# Patient Record
Sex: MALE | URBAN - METROPOLITAN AREA
[De-identification: names, ages, dates, MRNs, and addresses within clinical notes are randomized per-mention and may not be internally consistent; named-entity substitution may affect disease eponyms.]

---

## 2020-12-06 ENCOUNTER — INPATIENT (INPATIENT)
Facility: HOSPITAL | Age: 70
LOS: 3 days | Discharge: HOME | DRG: 78 | End: 2020-12-10
Attending: FAMILY MEDICINE | Admitting: STUDENT IN AN ORGANIZED HEALTH CARE EDUCATION/TRAINING PROGRAM
Payer: COMMERCIAL

## 2020-12-06 VITALS
HEART RATE: 107 BPM | OXYGEN SATURATION: 99 % | RESPIRATION RATE: 18 BRPM | DIASTOLIC BLOOD PRESSURE: 66 MMHG | SYSTOLIC BLOOD PRESSURE: 203 MMHG | TEMPERATURE: 98 F | WEIGHT: 160.06 LBS | HEIGHT: 66 IN

## 2020-12-06 DIAGNOSIS — Z29.9 ENCOUNTER FOR PROPHYLACTIC MEASURES, UNSPECIFIED: ICD-10-CM

## 2020-12-06 DIAGNOSIS — R00.0 TACHYCARDIA, UNSPECIFIED: ICD-10-CM

## 2020-12-06 DIAGNOSIS — I25.10 ATHEROSCLEROTIC HEART DISEASE OF NATIVE CORONARY ARTERY WITHOUT ANGINA PECTORIS: ICD-10-CM

## 2020-12-06 DIAGNOSIS — F03.90 UNSPECIFIED DEMENTIA WITHOUT BEHAVIORAL DISTURBANCE: ICD-10-CM

## 2020-12-06 DIAGNOSIS — E11.9 TYPE 2 DIABETES MELLITUS WITHOUT COMPLICATIONS: ICD-10-CM

## 2020-12-06 DIAGNOSIS — Z95.1 PRESENCE OF AORTOCORONARY BYPASS GRAFT: Chronic | ICD-10-CM

## 2020-12-06 DIAGNOSIS — I10 ESSENTIAL (PRIMARY) HYPERTENSION: ICD-10-CM

## 2020-12-06 DIAGNOSIS — R41.82 ALTERED MENTAL STATUS, UNSPECIFIED: ICD-10-CM

## 2020-12-06 LAB
ALBUMIN SERPL ELPH-MCNC: 3.4 G/DL — SIGNIFICANT CHANGE UP (ref 3.3–5)
ALP SERPL-CCNC: 114 U/L — SIGNIFICANT CHANGE UP (ref 40–120)
ALT FLD-CCNC: 30 U/L — SIGNIFICANT CHANGE UP (ref 12–78)
AMMONIA BLD-MCNC: 32 UMOL/L — SIGNIFICANT CHANGE UP (ref 11–32)
AMPHET UR-MCNC: NEGATIVE — SIGNIFICANT CHANGE UP
ANION GAP SERPL CALC-SCNC: 11 MMOL/L — SIGNIFICANT CHANGE UP (ref 5–17)
APPEARANCE UR: CLEAR — SIGNIFICANT CHANGE UP
APTT BLD: 30.5 SEC — SIGNIFICANT CHANGE UP (ref 27.5–35.5)
AST SERPL-CCNC: 20 U/L — SIGNIFICANT CHANGE UP (ref 15–37)
BACTERIA # UR AUTO: ABNORMAL
BARBITURATES UR SCN-MCNC: NEGATIVE — SIGNIFICANT CHANGE UP
BASOPHILS # BLD AUTO: 0.04 K/UL — SIGNIFICANT CHANGE UP (ref 0–0.2)
BASOPHILS # BLD AUTO: 0.04 K/UL — SIGNIFICANT CHANGE UP (ref 0–0.2)
BASOPHILS NFR BLD AUTO: 0.3 % — SIGNIFICANT CHANGE UP (ref 0–2)
BASOPHILS NFR BLD AUTO: 0.4 % — SIGNIFICANT CHANGE UP (ref 0–2)
BENZODIAZ UR-MCNC: NEGATIVE — SIGNIFICANT CHANGE UP
BILIRUB SERPL-MCNC: 0.5 MG/DL — SIGNIFICANT CHANGE UP (ref 0.2–1.2)
BILIRUB UR-MCNC: NEGATIVE — SIGNIFICANT CHANGE UP
BUN SERPL-MCNC: 28 MG/DL — HIGH (ref 7–23)
CALCIUM SERPL-MCNC: 8.9 MG/DL — SIGNIFICANT CHANGE UP (ref 8.5–10.1)
CHLORIDE SERPL-SCNC: 104 MMOL/L — SIGNIFICANT CHANGE UP (ref 96–108)
CK MB CFR SERPL CALC: 3.9 NG/ML — HIGH (ref 0–3.6)
CO2 SERPL-SCNC: 22 MMOL/L — SIGNIFICANT CHANGE UP (ref 22–31)
COCAINE METAB.OTHER UR-MCNC: NEGATIVE — SIGNIFICANT CHANGE UP
COLOR SPEC: YELLOW — SIGNIFICANT CHANGE UP
CREAT SERPL-MCNC: 1.4 MG/DL — HIGH (ref 0.5–1.3)
DIFF PNL FLD: ABNORMAL
EOSINOPHIL # BLD AUTO: 0.1 K/UL — SIGNIFICANT CHANGE UP (ref 0–0.5)
EOSINOPHIL # BLD AUTO: 0.11 K/UL — SIGNIFICANT CHANGE UP (ref 0–0.5)
EOSINOPHIL NFR BLD AUTO: 0.9 % — SIGNIFICANT CHANGE UP (ref 0–6)
EOSINOPHIL NFR BLD AUTO: 1 % — SIGNIFICANT CHANGE UP (ref 0–6)
ETHANOL SERPL-MCNC: <10 MG/DL — SIGNIFICANT CHANGE UP (ref 0–10)
GLUCOSE SERPL-MCNC: 291 MG/DL — HIGH (ref 70–99)
GLUCOSE UR QL: 1000 MG/DL
HCT VFR BLD CALC: 35.9 % — LOW (ref 39–50)
HCT VFR BLD CALC: 39.4 % — SIGNIFICANT CHANGE UP (ref 39–50)
HGB BLD-MCNC: 12.3 G/DL — LOW (ref 13–17)
HGB BLD-MCNC: 13.3 G/DL — SIGNIFICANT CHANGE UP (ref 13–17)
IMM GRANULOCYTES NFR BLD AUTO: 0.4 % — SIGNIFICANT CHANGE UP (ref 0–1.5)
IMM GRANULOCYTES NFR BLD AUTO: 0.4 % — SIGNIFICANT CHANGE UP (ref 0–1.5)
INR BLD: 1.03 RATIO — SIGNIFICANT CHANGE UP (ref 0.88–1.16)
KETONES UR-MCNC: NEGATIVE — SIGNIFICANT CHANGE UP
LACTATE SERPL-SCNC: 1.7 MMOL/L — SIGNIFICANT CHANGE UP (ref 0.7–2)
LACTATE SERPL-SCNC: 2.1 MMOL/L — HIGH (ref 0.7–2)
LEUKOCYTE ESTERASE UR-ACNC: NEGATIVE — SIGNIFICANT CHANGE UP
LYMPHOCYTES # BLD AUTO: 1.22 K/UL — SIGNIFICANT CHANGE UP (ref 1–3.3)
LYMPHOCYTES # BLD AUTO: 1.43 K/UL — SIGNIFICANT CHANGE UP (ref 1–3.3)
LYMPHOCYTES # BLD AUTO: 10.5 % — LOW (ref 13–44)
LYMPHOCYTES # BLD AUTO: 14.9 % — SIGNIFICANT CHANGE UP (ref 13–44)
MAGNESIUM SERPL-MCNC: 1.7 MG/DL — SIGNIFICANT CHANGE UP (ref 1.6–2.6)
MCHC RBC-ENTMCNC: 29 PG — SIGNIFICANT CHANGE UP (ref 27–34)
MCHC RBC-ENTMCNC: 29.4 PG — SIGNIFICANT CHANGE UP (ref 27–34)
MCHC RBC-ENTMCNC: 33.8 GM/DL — SIGNIFICANT CHANGE UP (ref 32–36)
MCHC RBC-ENTMCNC: 34.3 GM/DL — SIGNIFICANT CHANGE UP (ref 32–36)
MCV RBC AUTO: 85.9 FL — SIGNIFICANT CHANGE UP (ref 80–100)
MCV RBC AUTO: 86 FL — SIGNIFICANT CHANGE UP (ref 80–100)
METHADONE UR-MCNC: NEGATIVE — SIGNIFICANT CHANGE UP
MONOCYTES # BLD AUTO: 0.63 K/UL — SIGNIFICANT CHANGE UP (ref 0–0.9)
MONOCYTES # BLD AUTO: 0.81 K/UL — SIGNIFICANT CHANGE UP (ref 0–0.9)
MONOCYTES NFR BLD AUTO: 5.4 % — SIGNIFICANT CHANGE UP (ref 2–14)
MONOCYTES NFR BLD AUTO: 8.4 % — SIGNIFICANT CHANGE UP (ref 2–14)
NEUTROPHILS # BLD AUTO: 7.18 K/UL — SIGNIFICANT CHANGE UP (ref 1.8–7.4)
NEUTROPHILS # BLD AUTO: 9.6 K/UL — HIGH (ref 1.8–7.4)
NEUTROPHILS NFR BLD AUTO: 74.9 % — SIGNIFICANT CHANGE UP (ref 43–77)
NEUTROPHILS NFR BLD AUTO: 82.5 % — HIGH (ref 43–77)
NITRITE UR-MCNC: NEGATIVE — SIGNIFICANT CHANGE UP
NRBC # BLD: 0 /100 WBCS — SIGNIFICANT CHANGE UP (ref 0–0)
NRBC # BLD: 0 /100 WBCS — SIGNIFICANT CHANGE UP (ref 0–0)
NT-PROBNP SERPL-SCNC: 218 PG/ML — HIGH (ref 0–125)
OPIATES UR-MCNC: NEGATIVE — SIGNIFICANT CHANGE UP
PCP SPEC-MCNC: SIGNIFICANT CHANGE UP
PCP UR-MCNC: NEGATIVE — SIGNIFICANT CHANGE UP
PH UR: 6 — SIGNIFICANT CHANGE UP (ref 5–8)
PLATELET # BLD AUTO: 305 K/UL — SIGNIFICANT CHANGE UP (ref 150–400)
PLATELET # BLD AUTO: 346 K/UL — SIGNIFICANT CHANGE UP (ref 150–400)
POTASSIUM SERPL-MCNC: 4 MMOL/L — SIGNIFICANT CHANGE UP (ref 3.5–5.3)
POTASSIUM SERPL-SCNC: 4 MMOL/L — SIGNIFICANT CHANGE UP (ref 3.5–5.3)
PROT SERPL-MCNC: 7.9 G/DL — SIGNIFICANT CHANGE UP (ref 6–8.3)
PROT UR-MCNC: 100
PROTHROM AB SERPL-ACNC: 12 SEC — SIGNIFICANT CHANGE UP (ref 10.6–13.6)
RBC # BLD: 4.18 M/UL — LOW (ref 4.2–5.8)
RBC # BLD: 4.58 M/UL — SIGNIFICANT CHANGE UP (ref 4.2–5.8)
RBC # FLD: 14.1 % — SIGNIFICANT CHANGE UP (ref 10.3–14.5)
RBC # FLD: 14.2 % — SIGNIFICANT CHANGE UP (ref 10.3–14.5)
RBC CASTS # UR COMP ASSIST: ABNORMAL /HPF (ref 0–4)
SARS-COV-2 RNA SPEC QL NAA+PROBE: SIGNIFICANT CHANGE UP
SODIUM SERPL-SCNC: 137 MMOL/L — SIGNIFICANT CHANGE UP (ref 135–145)
SP GR SPEC: 1.01 — SIGNIFICANT CHANGE UP (ref 1.01–1.02)
THC UR QL: NEGATIVE — SIGNIFICANT CHANGE UP
TROPONIN I SERPL-MCNC: <.015 NG/ML — SIGNIFICANT CHANGE UP (ref 0.01–0.04)
TSH SERPL-MCNC: 4.45 UIU/ML — HIGH (ref 0.36–3.74)
UROBILINOGEN FLD QL: NEGATIVE — SIGNIFICANT CHANGE UP
WBC # BLD: 11.65 K/UL — HIGH (ref 3.8–10.5)
WBC # BLD: 9.6 K/UL — SIGNIFICANT CHANGE UP (ref 3.8–10.5)
WBC # FLD AUTO: 11.65 K/UL — HIGH (ref 3.8–10.5)
WBC # FLD AUTO: 9.6 K/UL — SIGNIFICANT CHANGE UP (ref 3.8–10.5)
WBC UR QL: SIGNIFICANT CHANGE UP

## 2020-12-06 PROCEDURE — 93010 ELECTROCARDIOGRAM REPORT: CPT

## 2020-12-06 PROCEDURE — 99285 EMERGENCY DEPT VISIT HI MDM: CPT

## 2020-12-06 PROCEDURE — 99223 1ST HOSP IP/OBS HIGH 75: CPT | Mod: AI

## 2020-12-06 PROCEDURE — 71045 X-RAY EXAM CHEST 1 VIEW: CPT | Mod: 26

## 2020-12-06 PROCEDURE — 70450 CT HEAD/BRAIN W/O DYE: CPT | Mod: 26

## 2020-12-06 RX ORDER — LEVOTHYROXINE SODIUM 125 MCG
1 TABLET ORAL
Qty: 0 | Refills: 0 | DISCHARGE

## 2020-12-06 RX ORDER — SODIUM CHLORIDE 9 MG/ML
1000 INJECTION, SOLUTION INTRAVENOUS
Refills: 0 | Status: DISCONTINUED | OUTPATIENT
Start: 2020-12-06 | End: 2020-12-10

## 2020-12-06 RX ORDER — GLUCAGON INJECTION, SOLUTION 0.5 MG/.1ML
1 INJECTION, SOLUTION SUBCUTANEOUS ONCE
Refills: 0 | Status: DISCONTINUED | OUTPATIENT
Start: 2020-12-06 | End: 2020-12-10

## 2020-12-06 RX ORDER — HALOPERIDOL DECANOATE 100 MG/ML
2.5 INJECTION INTRAMUSCULAR ONCE
Refills: 0 | Status: COMPLETED | OUTPATIENT
Start: 2020-12-06 | End: 2020-12-06

## 2020-12-06 RX ORDER — INSULIN LISPRO 100/ML
VIAL (ML) SUBCUTANEOUS AT BEDTIME
Refills: 0 | Status: DISCONTINUED | OUTPATIENT
Start: 2020-12-06 | End: 2020-12-10

## 2020-12-06 RX ORDER — ATORVASTATIN CALCIUM 80 MG/1
40 TABLET, FILM COATED ORAL AT BEDTIME
Refills: 0 | Status: DISCONTINUED | OUTPATIENT
Start: 2020-12-06 | End: 2020-12-10

## 2020-12-06 RX ORDER — INSULIN ASPART 100 [IU]/ML
0 INJECTION, SOLUTION SUBCUTANEOUS
Qty: 0 | Refills: 0 | DISCHARGE

## 2020-12-06 RX ORDER — INSULIN LISPRO 100/ML
VIAL (ML) SUBCUTANEOUS AT BEDTIME
Refills: 0 | Status: DISCONTINUED | OUTPATIENT
Start: 2020-12-06 | End: 2020-12-06

## 2020-12-06 RX ORDER — GLIMEPIRIDE 1 MG
1 TABLET ORAL
Qty: 0 | Refills: 0 | DISCHARGE

## 2020-12-06 RX ORDER — ATORVASTATIN CALCIUM 80 MG/1
1 TABLET, FILM COATED ORAL
Qty: 0 | Refills: 0 | DISCHARGE

## 2020-12-06 RX ORDER — INSULIN LISPRO 100/ML
VIAL (ML) SUBCUTANEOUS
Refills: 0 | Status: DISCONTINUED | OUTPATIENT
Start: 2020-12-06 | End: 2020-12-10

## 2020-12-06 RX ORDER — SODIUM CHLORIDE 9 MG/ML
1000 INJECTION INTRAMUSCULAR; INTRAVENOUS; SUBCUTANEOUS ONCE
Refills: 0 | Status: COMPLETED | OUTPATIENT
Start: 2020-12-06 | End: 2020-12-06

## 2020-12-06 RX ORDER — INSULIN GLARGINE 100 [IU]/ML
10 INJECTION, SOLUTION SUBCUTANEOUS AT BEDTIME
Refills: 0 | Status: DISCONTINUED | OUTPATIENT
Start: 2020-12-06 | End: 2020-12-10

## 2020-12-06 RX ORDER — ENOXAPARIN SODIUM 100 MG/ML
40 INJECTION SUBCUTANEOUS DAILY
Refills: 0 | Status: DISCONTINUED | OUTPATIENT
Start: 2020-12-06 | End: 2020-12-10

## 2020-12-06 RX ORDER — DEXTROSE 50 % IN WATER 50 %
25 SYRINGE (ML) INTRAVENOUS ONCE
Refills: 0 | Status: DISCONTINUED | OUTPATIENT
Start: 2020-12-06 | End: 2020-12-10

## 2020-12-06 RX ORDER — METFORMIN HYDROCHLORIDE 850 MG/1
1 TABLET ORAL
Qty: 0 | Refills: 0 | DISCHARGE

## 2020-12-06 RX ORDER — INSULIN GLARGINE 100 [IU]/ML
4 INJECTION, SOLUTION SUBCUTANEOUS AT BEDTIME
Refills: 0 | Status: DISCONTINUED | OUTPATIENT
Start: 2020-12-06 | End: 2020-12-06

## 2020-12-06 RX ORDER — INSULIN LISPRO 100/ML
VIAL (ML) SUBCUTANEOUS
Refills: 0 | Status: DISCONTINUED | OUTPATIENT
Start: 2020-12-06 | End: 2020-12-06

## 2020-12-06 RX ORDER — DONEPEZIL HYDROCHLORIDE 10 MG/1
1 TABLET, FILM COATED ORAL
Qty: 0 | Refills: 0 | DISCHARGE

## 2020-12-06 RX ORDER — LEVOTHYROXINE SODIUM 125 MCG
100 TABLET ORAL DAILY
Refills: 0 | Status: DISCONTINUED | OUTPATIENT
Start: 2020-12-06 | End: 2020-12-10

## 2020-12-06 RX ORDER — ENOXAPARIN SODIUM 100 MG/ML
30 INJECTION SUBCUTANEOUS DAILY
Refills: 0 | Status: DISCONTINUED | OUTPATIENT
Start: 2020-12-06 | End: 2020-12-06

## 2020-12-06 RX ORDER — ASPIRIN/CALCIUM CARB/MAGNESIUM 324 MG
81 TABLET ORAL DAILY
Refills: 0 | Status: DISCONTINUED | OUTPATIENT
Start: 2020-12-07 | End: 2020-12-10

## 2020-12-06 RX ORDER — DONEPEZIL HYDROCHLORIDE 10 MG/1
10 TABLET, FILM COATED ORAL AT BEDTIME
Refills: 0 | Status: DISCONTINUED | OUTPATIENT
Start: 2020-12-06 | End: 2020-12-10

## 2020-12-06 RX ORDER — ASPIRIN/CALCIUM CARB/MAGNESIUM 324 MG
325 TABLET ORAL ONCE
Refills: 0 | Status: COMPLETED | OUTPATIENT
Start: 2020-12-06 | End: 2020-12-06

## 2020-12-06 RX ORDER — SODIUM CHLORIDE 9 MG/ML
1000 INJECTION INTRAMUSCULAR; INTRAVENOUS; SUBCUTANEOUS
Refills: 0 | Status: DISCONTINUED | OUTPATIENT
Start: 2020-12-06 | End: 2020-12-08

## 2020-12-06 RX ORDER — AMLODIPINE BESYLATE 2.5 MG/1
10 TABLET ORAL DAILY
Refills: 0 | Status: DISCONTINUED | OUTPATIENT
Start: 2020-12-06 | End: 2020-12-07

## 2020-12-06 RX ORDER — DEXTROSE 50 % IN WATER 50 %
12.5 SYRINGE (ML) INTRAVENOUS ONCE
Refills: 0 | Status: DISCONTINUED | OUTPATIENT
Start: 2020-12-06 | End: 2020-12-10

## 2020-12-06 RX ORDER — DEXTROSE 50 % IN WATER 50 %
15 SYRINGE (ML) INTRAVENOUS ONCE
Refills: 0 | Status: DISCONTINUED | OUTPATIENT
Start: 2020-12-06 | End: 2020-12-10

## 2020-12-06 RX ADMIN — SODIUM CHLORIDE 1000 MILLILITER(S): 9 INJECTION INTRAMUSCULAR; INTRAVENOUS; SUBCUTANEOUS at 14:45

## 2020-12-06 RX ADMIN — HALOPERIDOL DECANOATE 2.5 MILLIGRAM(S): 100 INJECTION INTRAMUSCULAR at 15:49

## 2020-12-06 RX ADMIN — SODIUM CHLORIDE 1000 MILLILITER(S): 9 INJECTION INTRAMUSCULAR; INTRAVENOUS; SUBCUTANEOUS at 13:45

## 2020-12-06 RX ADMIN — SODIUM CHLORIDE 1000 MILLILITER(S): 9 INJECTION INTRAMUSCULAR; INTRAVENOUS; SUBCUTANEOUS at 12:30

## 2020-12-06 RX ADMIN — SODIUM CHLORIDE 1000 MILLILITER(S): 9 INJECTION INTRAMUSCULAR; INTRAVENOUS; SUBCUTANEOUS at 13:30

## 2020-12-06 RX ADMIN — Medication 1 MILLIGRAM(S): at 15:49

## 2020-12-06 RX ADMIN — SODIUM CHLORIDE 115 MILLILITER(S): 9 INJECTION INTRAMUSCULAR; INTRAVENOUS; SUBCUTANEOUS at 19:11

## 2020-12-06 RX ADMIN — Medication 325 MILLIGRAM(S): at 20:40

## 2020-12-06 RX ADMIN — AMLODIPINE BESYLATE 10 MILLIGRAM(S): 2.5 TABLET ORAL at 20:41

## 2020-12-06 NOTE — H&P ADULT - PROBLEM SELECTOR PLAN 3
Chronic, on amlodipine at home. Medication noncompliance  - On admission, /66.   - Avoid overcorrection of blood pressure. Keep SBP >160.   - Home medication: amlodipine 10mg QD with hold parameters.  - Monitor hemodynamics

## 2020-12-06 NOTE — ED PROVIDER NOTE - OBJECTIVE STATEMENT
70 male PMH CVA, memory loss, dementia, CAD CABGx4, DM2, presents to ER by ambulance with report of altered mental status. Patient provides no information, details obtained from son at the bedside who states today became incoherent, "looked like he was going to pass out", then "snapped out of it" and became very agitated, arguing with girlfriend. 70 male PMH CVA, memory loss, dementia, CAD CABGx4, DM2, presents to ER by ambulance with report of altered mental status. Patient provides no information, details obtained from son at the bedside who states today became incoherent, "looked like he was going to pass out", then "snapped out of it" and became very agitated, arguing with girlfriend. Son states patient has been getting more agitated, paranoid, refusing his medications at home, thinks he is going to be poisoned.

## 2020-12-06 NOTE — H&P ADULT - ATTENDING COMMENTS
Metabolic encephalopathy 2/2 cva vs tia vs seizure vs infection vs hypertensive urgency  - Neuro consulted, Head CT done, F/u MRI, echo, carotids, blood and urine cultures  - ASA, statin, F/u A1c, lipids  - NPO for swallow eval  - Monitor on telemetry  - Cardio and endo consult  - lantus and SSI for uncontrolled DM, f/u A1c.   - Palliative c/s for GOC  - ELev cr at 1.4 but unknown baseline. IV hydrate.  - Hypothyroidism; tsh elevated, likely uncontrolled due to medication non compliance

## 2020-12-06 NOTE — H&P ADULT - PROBLEM SELECTOR PLAN 1
Patient presenting for AMS and agitation since AM. DDX includes TIA 2/2 hypertensive urgency due to medication noncompliance, hyperglycemia, metabolic encephalopathy, infection  - In ED, received Haldol 2.5, Ativan 1mg, and 2L IVF Bolus  - Admit to [facility]  - CT Head: Mild chronic microvascular changes without evidence of an acute transcortical infarction or hemorrhage  -CXR- clear lungs.   - f/u Thyroid panel, Lipid Panel, A1C, B12, folate  - Consider MR Brain to r/o TIA  - Neuro, Dr. Nieves, consulted; f/u recs Patient presenting for AMS and agitation since AM. DDX includes TIA 2/2 hypertensive urgency due to medication noncompliance, hyperglycemia, metabolic encephalopathy, infection  - In ED, received Haldol 2.5, Ativan 1mg, and 2L IVF Bolus  - Admit to [facility]  - CT Head: Mild chronic microvascular changes without evidence of an acute transcortical infarction or hemorrhage  -CXR- clear lungs.   - f/u Thyroid panel, Lipid Panel, A1C, B12, folate, ammonia  - MR Brain w/o contrast to r/o TIA  - Carotid dopplers  - ASA 325mg x1. c/w ASA 81mg QD. Continue with home high dose statin.   - Diet: NPO except meds. Pending official speech eval for recs.   - Neuro, Dr. Nieves, consulted; f/u recs Patient presenting for AMS and agitation since AM. DDX includes TIA 2/2 hypertensive urgency due to medication noncompliance, hyperglycemia, metabolic encephalopathy, infection  - In ED, received Haldol 2.5, Ativan 1mg, and 2L IVF Bolus  - Admit to [facility]  - CT Head: Mild chronic microvascular changes without evidence of an acute transcortical infarction or hemorrhage  -CXR- clear lungs.   - f/u Thyroid panel, Lipid Panel, A1C, B12, folate, ammonia  - MR Brain w/o contrast to r/o TIA  - Carotid dopplers  - f/u TTE  - ASA 325mg x1. c/w ASA 81mg QD. Continue with home high dose statin.   - Diet: NPO except meds. Pending official speech eval for recs.   - Neuro, Dr. Nieves, consulted; f/u recs Patient presenting for AMS and agitation since AM. DDX includes TIA 2/2 hypertensive urgency due to medication noncompliance, hyperglycemia, metabolic encephalopathy, infection  - In ED, received Haldol 2.5, Ativan 1mg, and 2L IVF Bolus  - Admit to [facility]  - CT Head: Mild chronic microvascular changes without evidence of an acute transcortical infarction or hemorrhage  -CXR- clear lungs.   - f/u Thyroid panel, Lipid Panel, A1C, B12, folate, ammonia  - MR Brain w/o contrast to r/o TIA  - Carotid dopplers  - f/u TTE  - ASA 325mg x1. c/w ASA 81mg QD. Continue with home high dose statin.   - Diet: CCD, DASH/ TLC diet.   - f/u speech eval.   - Neuro, Dr. Nieves, consulted; f/u recs Metabolic encephalopathy likely multifactorial, TIA vs CVA, hypertensive urgency due to medication noncompliance, hyperglycemia, infection  - In ED, received Haldol 2.5, Ativan 1mg, and 2L IVF Bolus  - Admit to [facility]  - CT Head: Mild chronic microvascular changes without evidence of an acute transcortical infarction or hemorrhage  -CXR- clear lungs.   - f/u Thyroid panel, Lipid Panel, A1C, B12, folate, ammonia  - MR Brain w/o contrast to r/o TIA  - Carotid dopplers  - f/u TTE  - ASA 325mg x1. c/w ASA 81mg QD. Continue with home high dose statin.   - Diet: NPO except meds.  - f/u speech eval.   - Ativan 1mg Q6H PRN for agitation   - Neuro, Dr. Nieves, consulted; f/u recs

## 2020-12-06 NOTE — H&P ADULT - PROBLEM SELECTOR PLAN 7
Lovenox 40mg QD  IMPROVE VTE Individual Risk Assessment          RISK                                                          Points  [  ] Previous VTE                                                3  [  ] Thrombophilia                                            2  [  ] Lower limb paralysis                                  2        (unable to hold up >15 seconds)    [  ] Current Cancer                                            2         (within 6 months)  [  ] Immobilization > 24 hrs                             1  [  ] ICU/CCU stay > 24 hours                           1  [ x ] Age > 60                                                        1    IMPROVE VTE Score: 1 Chronic  -continue with home medication donepezil 10mg QD

## 2020-12-06 NOTE — H&P ADULT - NSHPSOCIALHISTORY_GEN_ALL_CORE
Hx obtained from son, Jose E  Tobacco: 1980s-2010, unaware of how much but states few cigarettes a day. Quit 10 years ago.   EtOH: denies  Recreational drug use: denies  Occupation: retired - [employer]  Patient lives in apartment with girlfriend, and his son, Hector. No LAUREN, 3-4 steps to first floor landing, lives on first floor.  Patient performs limited ADLs. Unable to drive, go shopping, or balance checkbook alone without assistance from GF.  Ambulates independently with no assisted device.

## 2020-12-06 NOTE — H&P ADULT - PROBLEM SELECTOR PLAN 6
Lovenox 40mg QD  IMPROVE VTE Individual Risk Assessment          RISK                                                          Points  [  ] Previous VTE                                                3  [  ] Thrombophilia                                            2  [  ] Lower limb paralysis                                  2        (unable to hold up >15 seconds)    [  ] Current Cancer                                            2         (within 6 months)  [  ] Immobilization > 24 hrs                             1  [  ] ICU/CCU stay > 24 hours                           1  [ x ] Age > 60                                                        1    IMPROVE VTE Score: 1 Chronic  - c/w home atorvastatin 40mg QD,  - f/u TTE -continue home medications  - f/u Thyroid panel -continue home medications  - f/u Thyroid panel  -TSH elevated 4.45 likely 2/2 medication noncompliance.

## 2020-12-06 NOTE — H&P ADULT - PROBLEM SELECTOR PLAN 4
Chronic  -continue with home medication donepezil 10mg QD On admission, EKG Sinus Tachy 113. Likely 2/2 agitation vs infection   - WBC 11.65, Neutrophil 9.6, however no clear source. UA with no leuks or WBC or nitrites. f/u blood cultures, urine culture  - patient agitated during interview could contribute to tachycardia.   - remote tele monitoring  - Cardiology, [facility] consulted; f/u recs

## 2020-12-06 NOTE — H&P ADULT - NSICDXPASTMEDICALHX_GEN_ALL_CORE_FT
PAST MEDICAL HISTORY:  CAD (coronary artery disease) s/p CABG x4    Dementia     DM2 (diabetes mellitus, type 2)     H/O: CVA (cerebrovascular accident) 2018/2019    HTN (hypertension)

## 2020-12-06 NOTE — H&P ADULT - PROBLEM SELECTOR PLAN 8
Lovenox 40mg QD  IMPROVE VTE Individual Risk Assessment          RISK                                                          Points  [  ] Previous VTE                                                3  [  ] Thrombophilia                                            2  [  ] Lower limb paralysis                                  2        (unable to hold up >15 seconds)    [  ] Current Cancer                                            2         (within 6 months)  [  ] Immobilization > 24 hrs                             1  [  ] ICU/CCU stay > 24 hours                           1  [ x ] Age > 60                                                        1    IMPROVE VTE Score: 1

## 2020-12-06 NOTE — CONSULT NOTE ADULT - PROBLEM SELECTOR RECOMMENDATION 9
add lantus 10 units qhs  change mod dose admelog scale coverage qac/qhs  cont cons cho diet  goal bg 100-180 in hosp setting

## 2020-12-06 NOTE — CONSULT NOTE ADULT - SUBJECTIVE AND OBJECTIVE BOX
Patient is a 70y old  Male who presents with a chief complaint of AMS (06 Dec 2020 15:15)      Reason For Consult: dm2 uncontrolled    HPI:  The patient is a 70 male with PMHx of CVA ([id number]), dementia, hypothyroidism, CAD s/p CABGx4, DM2, HTN who presented to the ED for altered mental status. Son, Jose E Alston at bedside provided history. As per son, patient was dizzy in the morning, was sat down and he blankly stared for five minute duration. When he came back to, he was confused and agitated. Family called EMS, and pt BIBA. Patient repeating same words over and over again, stating he is fine and getting agitated that he is the hospital. As per son, pt continues to make up stories during conversation which started today. He notes father said that he had to sign over a business, when in fact, the patient does not have any business. He also was telling the son about an argument that they had which never occurred. Son states father was A&Ox3 yesterday and in usual state of health. Of note, patient with similar presentation in past, during last CVA. Son unsure of when last CVA was, but states father was hospitalized at [facility]. He had no residual weakness after previous stroke. As per son, patient was in usual state of health yesterday but has been noncompliant with meds for the past 2 weeks. Son, Jose E, does not live with patient. Told to speak to step brother, Hector.   Spoke to son, Hector Alston, over telephone at [phone]. He states father was inhorent in the morning and had the episode of blankly staring at the wall. When he began to respond again, he was slightly agitated and he could not tell who the son was. He states this was similar to past stroke in 2018/2019. Said patient is noncompliant with medication use, and only uses it when he feels like taking medications. Further information can be obtained from [facility] (Portuguese speaking only) at [phone].   In the ED:   Vital Signs: T(F): 97.6, HR: 107 -->86, BP: 203/66 -->139/78, RR: 18, SpO2: 99% on RA  Labs Significant for: WBC 11.65, BUN/Cr - 28/1.40, lactate 2.1, TSH 4.45, Glucose 291, CKMB 3.9, Pro-, UA: small blood, glucose, and occasional bacteria  EKG: Sinus Tach 113bpm, with PVCs; Possible left atrial enlargement, QTc 427ms  CT Head: Mild chronic microvascular changes without evidence of an acute transcortical infarction or hemorrhage. Consider MRI as clinically warranted.  CXR: clear lungs.   In ED pt received, 2L NaCl IV bolus, Haldol 2.5 mg x1, ativan 1mg x1  (06 Dec 2020 15:15)      PAST MEDICAL & SURGICAL HISTORY:  HTN (hypertension)    CAD (coronary artery disease)  s/p CABG x4    Dementia    DM2 (diabetes mellitus, type 2)    H/O: CVA (cerebrovascular accident)  2018/2019    S/P CABG x 4        FAMILY HISTORY:  No pertinent family history in first degree relatives          Social History:    MEDICATIONS  (STANDING):  amLODIPine   Tablet 10 milliGRAM(s) Oral daily  aspirin 325 milliGRAM(s) Oral once  atorvastatin 40 milliGRAM(s) Oral at bedtime  dextrose 40% Gel 15 Gram(s) Oral once  dextrose 5%. 1000 milliLiter(s) (50 mL/Hr) IV Continuous <Continuous>  dextrose 5%. 1000 milliLiter(s) (100 mL/Hr) IV Continuous <Continuous>  dextrose 50% Injectable 25 Gram(s) IV Push once  dextrose 50% Injectable 12.5 Gram(s) IV Push once  dextrose 50% Injectable 25 Gram(s) IV Push once  donepezil 10 milliGRAM(s) Oral at bedtime  enoxaparin Injectable 40 milliGRAM(s) SubCutaneous daily  glucagon  Injectable 1 milliGRAM(s) IntraMuscular once  insulin glargine Injectable (LANTUS) 4 Unit(s) SubCutaneous at bedtime  insulin lispro (ADMELOG) corrective regimen sliding scale   SubCutaneous three times a day before meals  insulin lispro (ADMELOG) corrective regimen sliding scale   SubCutaneous at bedtime  levothyroxine 100 MICROGram(s) Oral daily  sodium chloride 0.9%. 1000 milliLiter(s) (115 mL/Hr) IV Continuous <Continuous>    MEDICATIONS  (PRN):  LORazepam   Injectable 1 milliGRAM(s) IV Push every 6 hours PRN Agitation        T(C): 36.4 (12-06-20 @ 11:26), Max: 36.4 (12-06-20 @ 11:26)  HR: 88 (12-06-20 @ 16:32) (86 - 107)  BP: 150/81 (12-06-20 @ 16:32) (139/78 - 203/66)  RR: 16 (12-06-20 @ 16:32) (16 - 18)  SpO2: 95% (12-06-20 @ 16:32) (95% - 100%)  Wt(kg): --    PHYSICAL EXAM:  GENERAL: NAD, well-groomed, well-developed  HEAD:  Atraumatic, Normocephalic  NECK: Supple, No JVD, Normal thyroid  CHEST/LUNG: Clear to percussion bilaterally; No rales, rhonchi, wheezing, or rubs  HEART: Regular rate and rhythm; No murmurs, rubs, or gallops  ABDOMEN: Soft, Nontender, Nondistended; Bowel sounds present  EXTREMITIES:  2+ Peripheral Pulses, No clubbing, cyanosis, or edema  SKIN: No rashes or lesions    CAPILLARY BLOOD GLUCOSE      POCT Blood Glucose.: 361 mg/dL (06 Dec 2020 11:42)  POCT Blood Glucose.: 342 mg/dL (06 Dec 2020 11:42)                            12.3   9.60  )-----------( 305      ( 06 Dec 2020 18:07 )             35.9       CMP:  12-06 @ 12:45  SGPT 30  Albumin 3.4   Alk Phos 114   Anion Gap 11   SGOT 20   Total Bili 0.5   BUN 28   Calcium Total 8.9   CO2 22   Chloride 104   Creatinine 1.40   eGFR if AA 58   eGFR if non AA 50   Glucose 291   Potassium 4.0   Protein 7.9   Sodium 137      Thyroid Function Tests:  12-06 @ 12:45 TSH 4.45 FreeT4 -- T3 -- Anti TPO -- Anti Thyroglobulin Ab -- TSI --      Diabetes Tests:       Radiology:

## 2020-12-06 NOTE — H&P ADULT - NSHPREVIEWOFSYSTEMS_GEN_ALL_CORE
CONSTITUTIONAL: denies fever, chills, fatigue, weakness  HEENT: denies blurred vision, sore throat  SKIN: denies new lesions, rash  CARDIOVASCULAR: denies chest pain, chest pressure, palpitations  RESPIRATORY: denies shortness of breath, sputum production  GASTROINTESTINAL: denies nausea, vomiting, diarrhea, abdominal pain  GENITOURINARY: denies dysuria, discharge  NEUROLOGICAL: denies numbness, headache, focal weakness  MUSCULOSKELETAL: denies new joint pain, muscle aches  HEMATOLOGIC: denies gross bleeding, bruising  LYMPHATICS: denies enlarged lymph nodes, extremity swelling  PSYCHIATRIC: denies recent changes in anxiety, depression  ENDOCRINOLOGIC: denies sweating, cold or heat intolerance Unable to obtain 2/2 AMS. Pt keeps reiterating that he is fine.

## 2020-12-06 NOTE — H&P ADULT - NSHPPHYSICALEXAM_GEN_ALL_CORE
T(C): 36.4 (12-06-20 @ 11:26), Max: 36.4 (12-06-20 @ 11:26)  HR: 86 (12-06-20 @ 13:40) (86 - 107)  BP: 139/78 (12-06-20 @ 13:40) (139/78 - 203/66)  RR: 18 (12-06-20 @ 13:40) (18 - 18)  SpO2: 100% (12-06-20 @ 13:40) (99% - 100%)    Physical exam slightly limited 2/2 AMS and agitation.  General: elderly male, agitated, in NAD  HEENT: NCAT, PERRL, moist mucous membranes   Neck: Supple, nontender  Neurology: A&Ox2, CN II-XII grossly intact, sensation intact, repeating that he is fine to all line of questioning.    Respiratory: CTA B/L, No W/R/R  CV: RRR, +S1/S2, no murmurs, rubs or gallops  Abdominal: Soft, NT, ND +BSx4  Extremities: No C/C/E, + peripheral pulses  MSK: FROM, no joint erythema or warmth, no joint swelling   Skin: warm, dry, normal color

## 2020-12-06 NOTE — H&P ADULT - PROBLEM SELECTOR PLAN 5
Chronic  - c/w home atorvastatin 40mg QD, Chronic  - c/w home atorvastatin 40mg QD,  - f/u TTE Chronic  -continue with home medication donepezil 10mg QD Chronic  - c/w home atorvastatin 40mg QD,   -  x1, c/w 81mg QD  - f/u TTE

## 2020-12-06 NOTE — H&P ADULT - ASSESSMENT
70 male with PMHx of CVA ([id number]), dementia, CAD s/p CABGx4, DM2, HTN, BIBA 2/2 AMS and agitation. Admit to [facility] for AMS.

## 2020-12-06 NOTE — PROGRESS NOTE ADULT - SUBJECTIVE AND OBJECTIVE BOX
neuro cons dict  seen for ams  etiology unclear  hx of cva/dementia  brain mri without  carotid doppler  echo  asa.   lipid panel hb1c  tsh,b12,folate, ammonia level

## 2020-12-06 NOTE — ED PROVIDER NOTE - MUSCULOSKELETAL MINIMAL EXAM
normal range of motion/atraumatic moving all extremities, no focal weakness/atraumatic/normal range of motion

## 2020-12-06 NOTE — ED ADULT NURSE NOTE - OBJECTIVE STATEMENT
Pt  presents to the ED via ambulance s/p AMS, pt is confuse, repeats what is asked of him, can follow simple commands, a/o to name only , does not know birth date , place nor time. Pt can move all of his extremities independently. Pt does not answer question when spoken in Lithuanian. Pt speaks in english. Pt doesn't recognize his son.

## 2020-12-06 NOTE — H&P ADULT - HISTORY OF PRESENT ILLNESS
The patient is a 70 male with PMHx of  CVA, memory loss, dementia, CAD s/p CABGx4, DM2 who presented to the ED for altered mental status. Son at bedside provided history.       In the ED:   Vital Signs: T(F): 97.6, HR: 107 -->86, BP: 203/66 -->139/78, RR: 18, SpO2: 99% on RA  Labs Significant for: WBC 11.65, BUN/Cr - 28/1.40, lactate 2.1, TSH 4.45, Glucose 291, CKMB 3.9, Pro-, UA: small blood, glucose, and occasional bacteria  EKG***  CT Head: Mild chronic microvascular changes without evidence of an acute transcortical infarction or hemorrhage. Consider MRI as clinically warranted.  CxR: clear lungs The patient is a 70 male with PMHx of CVA ([id number]), dementia, CAD s/p CABGx4, DM2, HTN who presented to the ED for altered mental status. Son, Jose E Alston at bedside provided history. As per son, patient was dizzy in the morning, was sat down and he blankly stared for five minute duration. When he came back to, he was confused and agitated. Family called EMS, and pt BIBA. Patient repeating same words over and over again, stating he is fine and getting agitated that he is the hospital. As per son, pt continues to make up stories during conversation which started today. He notes father said that he had to sign over a business, when in fact, the patient does not have any business. He also was telling the son about an argument that they had which never occurred. Of note, patient with similar presentation in past, during last CVA. Son unsure of when last CVA was, but states father was hospitalized at [facility]. He had no residual weakness after previous stroke. As per son, patient was in usual state of health yesterday but has been noncompliant with meds for the past 2 weeks. Son, Jose E, does not live with patient. Told to speak to step brother, Hector.   Spoke to son, Hector Alston, over telephone at [phone]. He states father was inhorent in the morning and had the episode of blankly staring at the wall. When he began to respond again, he was slightly agitated and he could not tell who the son was. He states this was similar to past stroke in 2018/2019. Said patient is noncompliant with medication use, and only uses it when he feels like taking medications.   In the ED:   Vital Signs: T(F): 97.6, HR: 107 -->86, BP: 203/66 -->139/78, RR: 18, SpO2: 99% on RA  Labs Significant for: WBC 11.65, BUN/Cr - 28/1.40, lactate 2.1, TSH 4.45, Glucose 291, CKMB 3.9, Pro-, UA: small blood, glucose, and occasional bacteria  EKG: Sinus Tach 113bpm, with PVCs; Possible left atrial enlargement, QTc 427ms  CT Head: Mild chronic microvascular changes without evidence of an acute transcortical infarction or hemorrhage. Consider MRI as clinically warranted.  CXR: clear lungs.   In ED pt received, 2L NaCl IV bolus, Haldol 2.5 mg x1, ativan 1mg x1 The patient is a 70 male with PMHx of CVA ([id number]), dementia, CAD s/p CABGx4, DM2, HTN who presented to the ED for altered mental status. Son, Jose E Alston at bedside provided history. As per son, patient was dizzy in the morning, was sat down and he blankly stared for five minute duration. When he came back to, he was confused and agitated. Family called EMS, and pt BIBA. Patient repeating same words over and over again, stating he is fine and getting agitated that he is the hospital. As per son, pt continues to make up stories during conversation which started today. He notes father said that he had to sign over a business, when in fact, the patient does not have any business. He also was telling the son about an argument that they had which never occurred. Of note, patient with similar presentation in past, during last CVA. Son unsure of when last CVA was, but states father was hospitalized at [facility]. He had no residual weakness after previous stroke. As per son, patient was in usual state of health yesterday but has been noncompliant with meds for the past 2 weeks. Son, Jose E, does not live with patient. Told to speak to step brother, Hector.   Spoke to son, Hector Alston, over telephone at [phone]. He states father was inhorent in the morning and had the episode of blankly staring at the wall. When he began to respond again, he was slightly agitated and he could not tell who the son was. He states this was similar to past stroke in 2018/2019. Said patient is noncompliant with medication use, and only uses it when he feels like taking medications. Further information can be obtained from [facility] (Cypriot speaking only) at [phone].   In the ED:   Vital Signs: T(F): 97.6, HR: 107 -->86, BP: 203/66 -->139/78, RR: 18, SpO2: 99% on RA  Labs Significant for: WBC 11.65, BUN/Cr - 28/1.40, lactate 2.1, TSH 4.45, Glucose 291, CKMB 3.9, Pro-, UA: small blood, glucose, and occasional bacteria  EKG: Sinus Tach 113bpm, with PVCs; Possible left atrial enlargement, QTc 427ms  CT Head: Mild chronic microvascular changes without evidence of an acute transcortical infarction or hemorrhage. Consider MRI as clinically warranted.  CXR: clear lungs.   In ED pt received, 2L NaCl IV bolus, Haldol 2.5 mg x1, ativan 1mg x1 The patient is a 70 male with PMHx of CVA ([phone]), dementia, hypothyroidism, CAD s/p CABGx4, DM2, HTN who presented to the ED for altered mental status. Son, Jose E Alston at bedside provided history. As per son, patient was dizzy in the morning, was sat down and he blankly stared for five minute duration. When he came back to, he was confused and agitated. Family called EMS, and pt BIBA. Patient repeating same words over and over again, stating he is fine and getting agitated that he is the hospital. As per son, pt continues to make up stories during conversation which started today. He notes father said that he had to sign over a business, when in fact, the patient does not have any business. He also was telling the son about an argument that they had which never occurred. Of note, patient with similar presentation in past, during last CVA. Son unsure of when last CVA was, but states father was hospitalized at [facility]. He had no residual weakness after previous stroke. As per son, patient was in usual state of health yesterday but has been noncompliant with meds for the past 2 weeks. Son, Jose E, does not live with patient. Told to speak to step brother, Hector.   Spoke to son, Hector Alston, over telephone at [phone]. He states father was inhorent in the morning and had the episode of blankly staring at the wall. When he began to respond again, he was slightly agitated and he could not tell who the son was. He states this was similar to past stroke in 2018/2019. Said patient is noncompliant with medication use, and only uses it when he feels like taking medications. Further information can be obtained from [facility] (Bermudian speaking only) at [phone].   In the ED:   Vital Signs: T(F): 97.6, HR: 107 -->86, BP: 203/66 -->139/78, RR: 18, SpO2: 99% on RA  Labs Significant for: WBC 11.65, BUN/Cr - 28/1.40, lactate 2.1, TSH 4.45, Glucose 291, CKMB 3.9, Pro-, UA: small blood, glucose, and occasional bacteria  EKG: Sinus Tach 113bpm, with PVCs; Possible left atrial enlargement, QTc 427ms  CT Head: Mild chronic microvascular changes without evidence of an acute transcortical infarction or hemorrhage. Consider MRI as clinically warranted.  CXR: clear lungs.   In ED pt received, 2L NaCl IV bolus, Haldol 2.5 mg x1, ativan 1mg x1 The patient is a 70 male with PMHx of CVA ([id number]), dementia, hypothyroidism, CAD s/p CABGx4, DM2, HTN who presented to the ED for altered mental status. Son, Jose E Alston at bedside provided history. As per son, patient was dizzy in the morning, was sat down and he blankly stared for five minute duration. When he came back to, he was confused and agitated. Family called EMS, and pt BIBA. Patient repeating same words over and over again, stating he is fine and getting agitated that he is the hospital. As per son, pt continues to make up stories during conversation which started today. He notes father said that he had to sign over a business, when in fact, the patient does not have any business. He also was telling the son about an argument that they had which never occurred. Son states father was A&Ox3 yesterday and in usual state of health. Of note, patient with similar presentation in past, during last CVA. Son unsure of when last CVA was, but states father was hospitalized at [facility]. He had no residual weakness after previous stroke. As per son, patient was in usual state of health yesterday but has been noncompliant with meds for the past 2 weeks. Son, Jose E, does not live with patient. Told to speak to step brother, Hector.   Spoke to son, Hector Alston, over telephone at [phone]. He states father was inhorent in the morning and had the episode of blankly staring at the wall. When he began to respond again, he was slightly agitated and he could not tell who the son was. He states this was similar to past stroke in 2018/2019. Said patient is noncompliant with medication use, and only uses it when he feels like taking medications. Further information can be obtained from [facility] (Samoan speaking only) at [phone].   In the ED:   Vital Signs: T(F): 97.6, HR: 107 -->86, BP: 203/66 -->139/78, RR: 18, SpO2: 99% on RA  Labs Significant for: WBC 11.65, BUN/Cr - 28/1.40, lactate 2.1, TSH 4.45, Glucose 291, CKMB 3.9, Pro-, UA: small blood, glucose, and occasional bacteria  EKG: Sinus Tach 113bpm, with PVCs; Possible left atrial enlargement, QTc 427ms  CT Head: Mild chronic microvascular changes without evidence of an acute transcortical infarction or hemorrhage. Consider MRI as clinically warranted.  CXR: clear lungs.   In ED pt received, 2L NaCl IV bolus, Haldol 2.5 mg x1, ativan 1mg x1

## 2020-12-07 DIAGNOSIS — N17.9 ACUTE KIDNEY FAILURE, UNSPECIFIED: ICD-10-CM

## 2020-12-07 LAB
A1C WITH ESTIMATED AVERAGE GLUCOSE RESULT: 7.9 % — HIGH (ref 4–5.6)
ALBUMIN SERPL ELPH-MCNC: 2.9 G/DL — LOW (ref 3.3–5)
ALP SERPL-CCNC: 107 U/L — SIGNIFICANT CHANGE UP (ref 40–120)
ALT FLD-CCNC: 28 U/L — SIGNIFICANT CHANGE UP (ref 12–78)
ANION GAP SERPL CALC-SCNC: 8 MMOL/L — SIGNIFICANT CHANGE UP (ref 5–17)
AST SERPL-CCNC: 24 U/L — SIGNIFICANT CHANGE UP (ref 15–37)
BASOPHILS # BLD AUTO: 0.04 K/UL — SIGNIFICANT CHANGE UP (ref 0–0.2)
BASOPHILS NFR BLD AUTO: 0.5 % — SIGNIFICANT CHANGE UP (ref 0–2)
BILIRUB SERPL-MCNC: 0.6 MG/DL — SIGNIFICANT CHANGE UP (ref 0.2–1.2)
BUN SERPL-MCNC: 16 MG/DL — SIGNIFICANT CHANGE UP (ref 7–23)
CALCIUM SERPL-MCNC: 8.5 MG/DL — SIGNIFICANT CHANGE UP (ref 8.5–10.1)
CHLORIDE SERPL-SCNC: 107 MMOL/L — SIGNIFICANT CHANGE UP (ref 96–108)
CHOLEST SERPL-MCNC: 160 MG/DL — SIGNIFICANT CHANGE UP
CO2 SERPL-SCNC: 25 MMOL/L — SIGNIFICANT CHANGE UP (ref 22–31)
CREAT SERPL-MCNC: 1 MG/DL — SIGNIFICANT CHANGE UP (ref 0.5–1.3)
CULTURE RESULTS: SIGNIFICANT CHANGE UP
EOSINOPHIL # BLD AUTO: 0.26 K/UL — SIGNIFICANT CHANGE UP (ref 0–0.5)
EOSINOPHIL NFR BLD AUTO: 3.1 % — SIGNIFICANT CHANGE UP (ref 0–6)
ESTIMATED AVERAGE GLUCOSE: 180 MG/DL — HIGH (ref 68–114)
FOLATE SERPL-MCNC: 13.9 NG/ML — SIGNIFICANT CHANGE UP
GLUCOSE SERPL-MCNC: 160 MG/DL — HIGH (ref 70–99)
HCT VFR BLD CALC: 36.8 % — LOW (ref 39–50)
HCV AB S/CO SERPL IA: 0.09 S/CO — SIGNIFICANT CHANGE UP (ref 0–0.99)
HCV AB SERPL-IMP: SIGNIFICANT CHANGE UP
HDLC SERPL-MCNC: 76 MG/DL — SIGNIFICANT CHANGE UP
HGB BLD-MCNC: 12.5 G/DL — LOW (ref 13–17)
IMM GRANULOCYTES NFR BLD AUTO: 0.5 % — SIGNIFICANT CHANGE UP (ref 0–1.5)
LIPID PNL WITH DIRECT LDL SERPL: 66 MG/DL — SIGNIFICANT CHANGE UP
LYMPHOCYTES # BLD AUTO: 1.35 K/UL — SIGNIFICANT CHANGE UP (ref 1–3.3)
LYMPHOCYTES # BLD AUTO: 15.8 % — SIGNIFICANT CHANGE UP (ref 13–44)
MAGNESIUM SERPL-MCNC: 1.6 MG/DL — SIGNIFICANT CHANGE UP (ref 1.6–2.6)
MCHC RBC-ENTMCNC: 29.2 PG — SIGNIFICANT CHANGE UP (ref 27–34)
MCHC RBC-ENTMCNC: 34 GM/DL — SIGNIFICANT CHANGE UP (ref 32–36)
MCV RBC AUTO: 86 FL — SIGNIFICANT CHANGE UP (ref 80–100)
MONOCYTES # BLD AUTO: 0.92 K/UL — HIGH (ref 0–0.9)
MONOCYTES NFR BLD AUTO: 10.8 % — SIGNIFICANT CHANGE UP (ref 2–14)
NEUTROPHILS # BLD AUTO: 5.91 K/UL — SIGNIFICANT CHANGE UP (ref 1.8–7.4)
NEUTROPHILS NFR BLD AUTO: 69.3 % — SIGNIFICANT CHANGE UP (ref 43–77)
NON HDL CHOLESTEROL: 84 MG/DL — SIGNIFICANT CHANGE UP
NRBC # BLD: 0 /100 WBCS — SIGNIFICANT CHANGE UP (ref 0–0)
PHOSPHATE SERPL-MCNC: 2.8 MG/DL — SIGNIFICANT CHANGE UP (ref 2.5–4.5)
PLATELET # BLD AUTO: 310 K/UL — SIGNIFICANT CHANGE UP (ref 150–400)
POTASSIUM SERPL-MCNC: 4.1 MMOL/L — SIGNIFICANT CHANGE UP (ref 3.5–5.3)
POTASSIUM SERPL-SCNC: 4.1 MMOL/L — SIGNIFICANT CHANGE UP (ref 3.5–5.3)
PROT SERPL-MCNC: 6.7 G/DL — SIGNIFICANT CHANGE UP (ref 6–8.3)
RBC # BLD: 4.28 M/UL — SIGNIFICANT CHANGE UP (ref 4.2–5.8)
RBC # FLD: 14.3 % — SIGNIFICANT CHANGE UP (ref 10.3–14.5)
SARS-COV-2 IGG SERPL QL IA: NEGATIVE — SIGNIFICANT CHANGE UP
SARS-COV-2 IGM SERPL IA-ACNC: 0.08 INDEX — SIGNIFICANT CHANGE UP
SODIUM SERPL-SCNC: 140 MMOL/L — SIGNIFICANT CHANGE UP (ref 135–145)
SPECIMEN SOURCE: SIGNIFICANT CHANGE UP
T3 SERPL-MCNC: 68 NG/DL — LOW (ref 80–200)
T4 AB SER-ACNC: 5.5 UG/DL — SIGNIFICANT CHANGE UP (ref 4.6–12)
TRIGL SERPL-MCNC: 90 MG/DL — SIGNIFICANT CHANGE UP
TSH SERPL-MCNC: 3.74 UIU/ML — SIGNIFICANT CHANGE UP (ref 0.36–3.74)
VIT B12 SERPL-MCNC: 724 PG/ML — SIGNIFICANT CHANGE UP (ref 232–1245)
WBC # BLD: 8.52 K/UL — SIGNIFICANT CHANGE UP (ref 3.8–10.5)
WBC # FLD AUTO: 8.52 K/UL — SIGNIFICANT CHANGE UP (ref 3.8–10.5)

## 2020-12-07 PROCEDURE — 93880 EXTRACRANIAL BILAT STUDY: CPT | Mod: 26

## 2020-12-07 PROCEDURE — 99233 SBSQ HOSP IP/OBS HIGH 50: CPT

## 2020-12-07 PROCEDURE — 99497 ADVNCD CARE PLAN 30 MIN: CPT

## 2020-12-07 PROCEDURE — 70551 MRI BRAIN STEM W/O DYE: CPT | Mod: 26

## 2020-12-07 PROCEDURE — 99223 1ST HOSP IP/OBS HIGH 75: CPT

## 2020-12-07 PROCEDURE — 93306 TTE W/DOPPLER COMPLETE: CPT | Mod: 26

## 2020-12-07 RX ORDER — METOPROLOL TARTRATE 50 MG
12.5 TABLET ORAL
Refills: 0 | Status: DISCONTINUED | OUTPATIENT
Start: 2020-12-07 | End: 2020-12-10

## 2020-12-07 RX ORDER — INFLUENZA VIRUS VACCINE 15; 15; 15; 15 UG/.5ML; UG/.5ML; UG/.5ML; UG/.5ML
0.5 SUSPENSION INTRAMUSCULAR ONCE
Refills: 0 | Status: DISCONTINUED | OUTPATIENT
Start: 2020-12-07 | End: 2020-12-10

## 2020-12-07 RX ADMIN — Medication 100 MICROGRAM(S): at 06:09

## 2020-12-07 RX ADMIN — Medication 12.5 MILLIGRAM(S): at 19:07

## 2020-12-07 RX ADMIN — INSULIN GLARGINE 10 UNIT(S): 100 INJECTION, SOLUTION SUBCUTANEOUS at 23:06

## 2020-12-07 RX ADMIN — Medication 2: at 07:29

## 2020-12-07 RX ADMIN — ENOXAPARIN SODIUM 40 MILLIGRAM(S): 100 INJECTION SUBCUTANEOUS at 12:11

## 2020-12-07 RX ADMIN — DONEPEZIL HYDROCHLORIDE 10 MILLIGRAM(S): 10 TABLET, FILM COATED ORAL at 23:05

## 2020-12-07 RX ADMIN — Medication 81 MILLIGRAM(S): at 12:11

## 2020-12-07 RX ADMIN — AMLODIPINE BESYLATE 10 MILLIGRAM(S): 2.5 TABLET ORAL at 06:09

## 2020-12-07 RX ADMIN — ATORVASTATIN CALCIUM 40 MILLIGRAM(S): 80 TABLET, FILM COATED ORAL at 23:05

## 2020-12-07 RX ADMIN — Medication 1 MILLIGRAM(S): at 18:59

## 2020-12-07 RX ADMIN — Medication 1 MILLIGRAM(S): at 09:25

## 2020-12-07 NOTE — PROGRESS NOTE ADULT - PROBLEM SELECTOR PLAN 1
Metabolic encephalopathy likely multifactorial, TIA vs CVA, hypertensive urgency due to medication noncompliance, hyperglycemia, infection  - In ED, received Haldol 2.5, Ativan 1mg, and 2L IVF Bolus  - Admit to F  - CT Head: Mild chronic microvascular changes without evidence of an acute transcortical infarction or hemorrhage  - MRI brain showed no acute infarcts  -CXR- clear lungs.   - f/u Thyroid panel, Lipid Panel, A1C, B12, folate, ammonia  - Carotid dopplers - no stenosis  - f/u TTE - pending results but performed  - ASA 325mg x1. c/w ASA 81mg QD. Continue with home high dose statin.   - Diet: dysphagia 2 +nectar thick recommended by swallow eval  - neuro recs appreciated

## 2020-12-07 NOTE — PATIENT PROFILE ADULT - STATED REASON FOR ADMISSION
Pt's son stated  was dizzy  and stared straight ahead for  minutes Pt's son stated was dizzy  and stared straight ahead for minutes

## 2020-12-07 NOTE — SWALLOW BEDSIDE ASSESSMENT ADULT - PHARYNGEAL PHASE
Delayed pharyngeal swallow/Decreased laryngeal elevation Throat clear post oral intake/Multiple swallows/Delayed pharyngeal swallow/Decreased laryngeal elevation

## 2020-12-07 NOTE — ED ADULT NURSE REASSESSMENT NOTE - NS ED NURSE REASSESS COMMENT FT1
2152: Spoke with Dr. Kim re pt's NPO status. MD will come to assess the pt.
2250: Spoke with Dr. Kim re Fs = 96, hold John cui evaluated by MD.
Attempted twice to call family to obtain telephone consent for MRI screening, messages left, awaiting callback.
received report from evening nurse pt   stable resting confortably at present  and vital signs stable awaiting bed assignment  Md Monson aware of pt FS and lantus held as per previous shift
Pt becoming increasingly agitated  attempting to climb OOB, medicated with Ativan
Presents to ER with AMS, hx of dementia.  Pt is confused, unable to tell me his name, , where he is or why he is hospitalized.  Awaiting remote tele bed, safety maintained.
Resting comfortably. Awaiting bed. Spoke to both son and daughter who was updated on pts current status.

## 2020-12-07 NOTE — SWALLOW BEDSIDE ASSESSMENT ADULT - SWALLOW EVAL: RECOMMENDED FEEDING/EATING TECHNIQUES
no straws/maintain upright posture during/after eating for 30 mins/oral hygiene/allow for swallow between intakes/alternate food with liquid/small sips/bites/check mouth frequently for oral residue/pocketing/crush medication (when feasible)/position upright (90 degrees)

## 2020-12-07 NOTE — PROGRESS NOTE ADULT - SUBJECTIVE AND OBJECTIVE BOX
CAPILLARY BLOOD GLUCOSE      POCT Blood Glucose.: 161 mg/dL (07 Dec 2020 07:24)  POCT Blood Glucose.: 96 mg/dL (06 Dec 2020 22:20)  POCT Blood Glucose.: 361 mg/dL (06 Dec 2020 11:42)  POCT Blood Glucose.: 342 mg/dL (06 Dec 2020 11:42)      Vital Signs Last 24 Hrs  T(C): 36.7 (07 Dec 2020 07:32), Max: 36.9 (06 Dec 2020 19:20)  T(F): 98.1 (07 Dec 2020 07:32), Max: 98.5 (06 Dec 2020 19:20)  HR: 83 (07 Dec 2020 07:32) (74 - 107)  BP: 160/84 (07 Dec 2020 07:32) (139/78 - 203/66)  BP(mean): --  RR: 15 (07 Dec 2020 07:32) (15 - 18)  SpO2: 99% (07 Dec 2020 07:32) (95% - 100%)      Respiratory: CTA B/L  CV: RRR, S1S2, no murmurs, rubs or gallops  Abdominal: Soft, NT, ND +BS, Last BM  Extremities: No edema, + peripheral pulses     12-07    140  |  107  |  16  ----------------------------<  160<H>  4.1   |  25  |  1.00    Ca    8.5      07 Dec 2020 06:11  Phos  2.8     12-07  Mg     1.6     12-07    TPro  6.7  /  Alb  2.9<L>  /  TBili  0.6  /  DBili  x   /  AST  24  /  ALT  28  /  AlkPhos  107  12-07      atorvastatin 40 milliGRAM(s) Oral at bedtime  dextrose 40% Gel 15 Gram(s) Oral once  dextrose 50% Injectable 25 Gram(s) IV Push once  dextrose 50% Injectable 12.5 Gram(s) IV Push once  dextrose 50% Injectable 25 Gram(s) IV Push once  glucagon  Injectable 1 milliGRAM(s) IntraMuscular once  insulin glargine Injectable (LANTUS) 10 Unit(s) SubCutaneous at bedtime  insulin lispro (ADMELOG) corrective regimen sliding scale   SubCutaneous three times a day before meals  insulin lispro (ADMELOG) corrective regimen sliding scale   SubCutaneous at bedtime  levothyroxine 100 MICROGram(s) Oral daily

## 2020-12-07 NOTE — PROGRESS NOTE ADULT - SUBJECTIVE AND OBJECTIVE BOX
Neurology Follow up note    SURY [id number]    HPI:  The patient is a 70 male with PMHx of CVA ([id number]), dementia, hypothyroidism, CAD s/p CABGx4, DM2, HTN who presented to the ED for altered mental status. Son, Jose E Alston at bedside provided history. As per son, patient was dizzy in the morning, was sat down and he blankly stared for five minute duration. When he came back to, he was confused and agitated. Family called EMS, and pt BIBA. Patient repeating same words over and over again, stating he is fine and getting agitated that he is the hospital. As per son, pt continues to make up stories during conversation which started today. He notes father said that he had to sign over a business, when in fact, the patient does not have any business. He also was telling the son about an argument that they had which never occurred. Son states father was A&Ox3 yesterday and in usual state of health. Of note, patient with similar presentation in past, during last CVA. Son unsure of when last CVA was, but states father was hospitalized at [facility]. He had no residual weakness after previous stroke. As per son, patient was in usual state of health yesterday but has been noncompliant with meds for the past 2 weeks. Son, Jose E, does not live with patient. Told to speak to step brother, Hector.   Spoke to son, Hector Alston, over telephone at [phone]. He states father was inhorent in the morning and had the episode of blankly staring at the wall. When he began to respond again, he was slightly agitated and he could not tell who the son was. He states this was similar to past stroke in 2018/. Said patient is noncompliant with medication use, and only uses it when he feels like taking medications. Further information can be obtained from [facility] (Citizen of [place] speaking only) at [phone].   In the ED:   Vital Signs: T(F): 97.6, HR: 107 -->86, BP: 203/66 -->139/78, RR: 18, SpO2: 99% on RA  Labs Significant for: WBC 11.65, BUN/Cr - 28/1.40, lactate 2.1, TSH 4.45, Glucose 291, CKMB 3.9, Pro-, UA: small blood, glucose, and occasional bacteria  EKG: Sinus Tach 113bpm, with PVCs; Possible left atrial enlargement, QTc 427ms  CT Head: Mild chronic microvascular changes without evidence of an acute transcortical infarction or hemorrhage. Consider MRI as clinically warranted.  CXR: clear lungs.   In ED pt received, 2L NaCl IV bolus, Haldol 2.5 mg x1, ativan 1mg x1  (06 Dec 2020 15:15)      Interval History -mentation slightly better today    Patient is seen, chart was reviewed and case was discussed with the treatment team.  Pt is not in any distress.   Lying on bed comfortably.   No events reported overnight.     Vital Signs Last 24 Hrs  T(C): 36.7 (07 Dec 2020 07:32), Max: 36.9 (06 Dec 2020 19:20)  T(F): 98.1 (07 Dec 2020 07:32), Max: 98.5 (06 Dec 2020 19:20)  HR: 83 (07 Dec 2020 07:32) (74 - 91)  BP: 160/84 (07 Dec 2020 07:32) (139/78 - 162/77)  BP(mean): --  RR: 15 (07 Dec 2020 07:32) (15 - 18)  SpO2: 99% (07 Dec 2020 07:32) (95% - 100%)        REVIEW OF SYSTEMS:    Constitutional: No fever, w  Eyes: No eye pain, visual disturbances, or discharge  ENT:  No difficulty hearing, tinnitus, vertigo; No sinus or throat pain  Neck: No pain or stiffness  Respiratory: No wheezing, chills or hemoptysis  Cardiovascular: No chest pain, palpitations,   Gastrointestinal: No abdominal or epigastric pain. No nausea, vomiting or hematemesis;  Genitourinary: No dysuria, frequency, hematuria or incontinence  Neurological: No headaches,  Psychiatric: No d, mood swings or difficulty sleeping  Musculoskeletal: No joint pain or swelling; No muscle, back or extremity pain  Skin: No itching, burning, rashes or lesions   Lymph Nodes: No enlarged glands  Endocrine: No heat or cold intolerance; No hair loss,  Allergy and Immunologic: No hives or eczema    On Neurological Examination:    Mental Status - Pt is alert, awake, oriented X1   Follows commands well and able to answer questions appropriately    Speech - Pt has dysarthria.    Cranial Nerves - Pupils 3 mm equal and reactive to light, extraocular eye movements intact. Pt has no visual field deficit.  Pt has no facial asymmetry. Facial sensation is intact.Tongue - is in midline.    Muscle tone - is normal     Motor Exam - 4+/5 all over, No drift. No shaking or tremors.    Sensory Exam - . Pt withdraws all extremities equally on stimulation. No asymmetry seen. No complaints of tingling, numbness.      coordination:    Finger to nose: normal      Deep tendon Reflexes - 2 plus all over.        .    Neck Supple -  Yes.     MEDICATIONS    amLODIPine   Tablet 10 milliGRAM(s) Oral daily  aspirin enteric coated 81 milliGRAM(s) Oral daily  atorvastatin 40 milliGRAM(s) Oral at bedtime  dextrose 40% Gel 15 Gram(s) Oral once  dextrose 5%. 1000 milliLiter(s) IV Continuous <Continuous>  dextrose 5%. 1000 milliLiter(s) IV Continuous <Continuous>  dextrose 50% Injectable 25 Gram(s) IV Push once  dextrose 50% Injectable 12.5 Gram(s) IV Push once  dextrose 50% Injectable 25 Gram(s) IV Push once  donepezil 10 milliGRAM(s) Oral at bedtime  enoxaparin Injectable 40 milliGRAM(s) SubCutaneous daily  glucagon  Injectable 1 milliGRAM(s) IntraMuscular once  insulin glargine Injectable (LANTUS) 10 Unit(s) SubCutaneous at bedtime  insulin lispro (ADMELOG) corrective regimen sliding scale   SubCutaneous three times a day before meals  insulin lispro (ADMELOG) corrective regimen sliding scale   SubCutaneous at bedtime  levothyroxine 100 MICROGram(s) Oral daily  LORazepam   Injectable 1 milliGRAM(s) IV Push every 6 hours PRN  sodium chloride 0.9%. 1000 milliLiter(s) IV Continuous <Continuous>      Allergies    No Known Allergies    Intolerances        LABS:  CBC Full  -  ( 07 Dec 2020 06:21 )  WBC Count : 8.52 K/uL  RBC Count : 4.28 M/uL  Hemoglobin : 12.5 g/dL  Hematocrit : 36.8 %  Platelet Count - Automated : 310 K/uL  Mean Cell Volume : 86.0 fl  Mean Cell Hemoglobin : 29.2 pg  %  Auto Lymphocyte % : 15.8 %  Auto Monocyte % : 10.8 %  Auto Eosinophil % : 3.1 %  Auto Basophil % : 0.5 %    Urinalysis Basic - ( 06 Dec 2020 12:45 )    Color: Yellow / Appearance: Clear / S.010 / pH: x  Gluc: x / Ketone: Negative  / Bili: Negative / Urobili: Negative   Blood: x / Protein: 100 / Nitrite: Negative   Leuk Esterase: Negative / RBC: 3-5 /HPF / WBC 0-2   Sq Epi: x / Non Sq Epi: x / Bacteria: Occasional          140  |  107  |  16  ----------------------------<  160<H>  4.1   |  25  |  1.00    Ca    8.5      07 Dec 2020 06:11  Phos  2.8       Mg     1.6         TPro  6.7  /  Alb  2.9<L>  /  TBili  0.6  /  DBili  x   /  AST  24  /  ALT  28  /  AlkPhos  107      Hemoglobin A1C:   Lipid Panel  @ 08:48  Total Cholesterol, Serum 160  LDL --  Triglycerides 90    Vitamin B12 Vitamin B12, Serum: 724 pg/mL ( @ 23:22)      RADIOLOGY    ASSESSMENT AND PLAN:      seen for ams ; r/o cva  hx of cva/dementia    brain mri without  echo  continue asa/statin  Physical therapy evaluation.  OOB to chair/ambulation with assistance only.  Pain is accessed and addressed.  Would continue to follow.

## 2020-12-07 NOTE — PATIENT PROFILE ADULT - NSPROPTRIGHTREPNAME_GEN_A__NUR
Romona Bloch, MD.     [facility]  Internal Medicine Resident, PGY-2  4/9/2019  2:49 PM Karley Buck

## 2020-12-07 NOTE — PROGRESS NOTE ADULT - ASSESSMENT
70 male with PMHx of CVA ([id number]), dementia, CAD s/p CABGx4, DM2, HTN, BIBA 2/2 AMS and agitation, admitted for metabolic encephalopathy likely due to tia vs dehydration.

## 2020-12-07 NOTE — PROGRESS NOTE ADULT - PROBLEM SELECTOR PLAN 3
Chronic, medication noncompliance, on metformin, glimiperide, and insulin at home  - On admission POCT 361, 341; Serum Glucose 291  - hold home meds  - f/u A1C  - low dose ISS and lantus  - Hypoglycemia protocol  - a1c 7.9%  - Endocrinology, Dr. Perlman, consulted; f/u recs

## 2020-12-07 NOTE — CONSULT NOTE ADULT - ATTENDING COMMENTS
The patient was personally seen and examined, in addition to being examined and evaluated by housestaff.  All elements of the note were edited where appropriate.    reactive sinus tach  hypertensive urgency in the setting of noncompliance  amlodipine not an ideal drug for htn noting dm with proteinuria and ventr ectopy. can consider ace/arb and bb pending clinical course  no acute ischemia

## 2020-12-07 NOTE — PROGRESS NOTE ADULT - PROBLEM SELECTOR PLAN 7
-continue home medications, levo 100mcg  -TSH elevated 4.45 on admission likely 2/2 medication noncompliance.

## 2020-12-07 NOTE — SWALLOW BEDSIDE ASSESSMENT ADULT - COMMENTS
Consult received and chart reviewed. Attempted to see the patient this afternoon for an initial assessment of swallow function, however, patient currently off the unit at echo. This department to re-attempt, as schedule permits. Discussed with RN.
Consult received and chart reviewed. The patient was seen at bedside this afternoon for an initial assessment of swallow function, at which time he was awake and cooperative. Patient able to follow simple English directives.    Per charting, the patient is a "70 male with PMHx of CVA ([id number]), dementia, CAD s/p CABGx4, DM2, HTN, BIBA 2/2 AMS and agitation. Admit to F for AMS."    WBC is WFL. MRI of the head revealed, "No acute intracranial hemorrhage or acute infarct." CXR revealed, "clear lungs."    Discussed results and recommendations from this evaluation with the patient, RN, and call out to referring MD.

## 2020-12-07 NOTE — SWALLOW BEDSIDE ASSESSMENT ADULT - SWALLOW EVAL: DIAGNOSIS
1. The patient demonstrated functional oral management of puree, honey thick, and nectar thick liquids marked by adequate bolus collection, transfer, and posterior transport. 2. The patient demonstrated a mild oral dysphagia for solids marked by prolonged oral manipulation resulting in delayed bolus collection, transfer, and posterior transport. 3. The patient demonstrated a mild-moderate oral dysphagia for thin liquids marked by delayed bolus collection and transfer with suspected posterior loss over the base of tongue. 4. The patient demonstrated a mild pharyngeal dysphagia for puree, solid, honey thick, and nectar thick liquids marked by a suspected delayed pharyngeal swallow trigger with evidence of hyolaryngeal elevation upon digital palpation w/o evidence of airway penetration. 5. The patient demonstrated a moderate-severe pharyngeal dysphagia for thin liquids marked by a suspected delayed pharyngeal swallow trigger evidence of hyolaryngeal elevation upon digital palpation

## 2020-12-07 NOTE — SWALLOW BEDSIDE ASSESSMENT ADULT - ASR SWALLOW ASPIRATION MONITOR
change of breathing pattern/fever/oral hygiene/position upright (90Y)/cough/throat clearing/upper respiratory infection/gurgly voice/pneumonia

## 2020-12-07 NOTE — PROGRESS NOTE ADULT - PROBLEM SELECTOR PLAN 4
Chronic, on amlodipine at home. Medication noncompliance  - On admission, /66 on admission.   - Home medication: amlodipine 10mg QD discontinued and started on metoprolol per cardio recs.

## 2020-12-07 NOTE — PROGRESS NOTE ADULT - SUBJECTIVE AND OBJECTIVE BOX
Patient is a 70y old  Male who presents with a chief complaint of AMS (07 Dec 2020 12:20)      INTERVAL HPI/OVERNIGHT EVENTS: Patient seen and examined at bedside this AM in ED. No overnight events occurred.   Spoke to patients daughter who lives in [place] and states she  is HCP  All questions answered  She wants to know PT recs so she can goknow if its safe for him to go home to his girlfriend or if he needs a facilty    MEDICATIONS  (STANDING):  amLODIPine   Tablet 10 milliGRAM(s) Oral daily  aspirin enteric coated 81 milliGRAM(s) Oral daily  atorvastatin 40 milliGRAM(s) Oral at bedtime  dextrose 40% Gel 15 Gram(s) Oral once  dextrose 5%. 1000 milliLiter(s) (50 mL/Hr) IV Continuous <Continuous>  dextrose 5%. 1000 milliLiter(s) (100 mL/Hr) IV Continuous <Continuous>  dextrose 50% Injectable 25 Gram(s) IV Push once  dextrose 50% Injectable 12.5 Gram(s) IV Push once  dextrose 50% Injectable 25 Gram(s) IV Push once  donepezil 10 milliGRAM(s) Oral at bedtime  enoxaparin Injectable 40 milliGRAM(s) SubCutaneous daily  glucagon  Injectable 1 milliGRAM(s) IntraMuscular once  insulin glargine Injectable (LANTUS) 10 Unit(s) SubCutaneous at bedtime  insulin lispro (ADMELOG) corrective regimen sliding scale   SubCutaneous three times a day before meals  insulin lispro (ADMELOG) corrective regimen sliding scale   SubCutaneous at bedtime  levothyroxine 100 MICROGram(s) Oral daily  sodium chloride 0.9%. 1000 milliLiter(s) (115 mL/Hr) IV Continuous <Continuous>    MEDICATIONS  (PRN):  LORazepam   Injectable 1 milliGRAM(s) IV Push every 6 hours PRN Agitation    Allergies  No Known Allergies  Intolerances    REVIEW OF SYSTEMS: unable to obtain ROS given pts mental status     Vital Signs Last 24 Hrs  T(C): 36.4 (07 Dec 2020 15:35), Max: 36.9 (06 Dec 2020 19:20)  T(F): 97.6 (07 Dec 2020 15:35), Max: 98.5 (06 Dec 2020 19:20)  HR: 99 (07 Dec 2020 15:35) (74 - 99)  BP: 149/89 (07 Dec 2020 15:35) (145/80 - 162/77)  BP(mean): --  RR: 15 (07 Dec 2020 15:35) (15 - 17)  SpO2: 99% (07 Dec 2020 15:35) (98% - 100%)    PHYSICAL EXAM:  GENERAL: NAD  HEENT:  anicteric, moist mucous membranes  CHEST/LUNG:  CTA b/l, no rales, wheezes, or rhonchi  HEART:  RRR, S1, S2  ABDOMEN:  BS+, soft, nontender, nondistended  EXTREMITIES: no edema, cyanosis, or calf tenderness  NERVOUS SYSTEM: Alert, sleeping, did not want to answer questions    LABS:                        12.5   8.52  )-----------( 310      ( 07 Dec 2020 06:21 )             36.8     CBC Full  -  ( 07 Dec 2020 06:21 )  WBC Count : 8.52 K/uL  Hemoglobin : 12.5 g/dL  Hematocrit : 36.8 %  Platelet Count - Automated : 310 K/uL  Mean Cell Volume : 86.0 fl  Mean Cell Hemoglobin : 29.2 pg  Mean Cell Hemoglobin Concentration : 34.0 gm/dL  Auto Neutrophil # : 5.91 K/uL  Auto Lymphocyte # : 1.35 K/uL  Auto Monocyte # : 0.92 K/uL  Auto Eosinophil # : 0.26 K/uL  Auto Basophil # : 0.04 K/uL  Auto Neutrophil % : 69.3 %  Auto Lymphocyte % : 15.8 %  Auto Monocyte % : 10.8 %  Auto Eosinophil % : 3.1 %  Auto Basophil % : 0.5 %    07 Dec 2020 06:11    140    |  107    |  16     ----------------------------<  160    4.1     |  25     |  1.00     Ca    8.5        07 Dec 2020 06:11  Phos  2.8       07 Dec 2020 06:11  Mg     1.6       07 Dec 2020 06:11    TPro  6.7    /  Alb  2.9    /  TBili  0.6    /  DBili  x      /  AST  24     /  ALT  28     /  AlkPhos  107    07 Dec 2020 06:11    PT/INR - ( 06 Dec 2020 12:45 )   PT: 12.0 sec;   INR: 1.03 ratio         PTT - ( 06 Dec 2020 12:45 )  PTT:30.5 sec  Urinalysis Basic - ( 06 Dec 2020 12:45 )    Color: Yellow / Appearance: Clear / S.010 / pH: x  Gluc: x / Ketone: Negative  / Bili: Negative / Urobili: Negative   Blood: x / Protein: 100 / Nitrite: Negative   Leuk Esterase: Negative / RBC: 3-5 /HPF / WBC 0-2   Sq Epi: x / Non Sq Epi: x / Bacteria: Occasional    CAPILLARY BLOOD GLUCOSE    POCT Blood Glucose.: 148 mg/dL (07 Dec 2020 15:29)  POCT Blood Glucose.: 131 mg/dL (07 Dec 2020 12:14)  POCT Blood Glucose.: 161 mg/dL (07 Dec 2020 07:24)  POCT Blood Glucose.: 96 mg/dL (06 Dec 2020 22:20)    Culture - Urine (collected 20 @ 17:55)  Source: .Urine Clean Catch (Midstream)  Final Report (20 @ 12:49):    <10,000 CFU/mL Normal Urogenital [place]        RADIOLOGY & ADDITIONAL TESTS:    Personally reviewed.     Consultant(s) Notes Reviewed:  [x] YES  [ ] NO

## 2020-12-07 NOTE — CONSULT NOTE ADULT - SUBJECTIVE AND OBJECTIVE BOX
[facility]         Karishma Trujillo, Raine Gottlieb, Clinton Chan Savella        [phone] (office)    Reason for Consult: sinus tachycardia     Interval HPI:  ID [id number] Charly   Patient seen and examined at bedside. No acute events overnight. Pt appeared mildly agitated and confused. Patient continued to say it is Sunday for almost all questions. ROS unable to be obtained 2/2 AMS.     HPI:  The patient is a 70 male with PMHx of CVA ([id number]), dementia, hypothyroidism, CAD s/p CABGx4, DM2, HTN who presented to the ED for altered mental status. Son, Jose E Alston at bedside provided history. As per son, patient was dizzy in the morning, was sat down and he blankly stared for five minute duration. When he came back to, he was confused and agitated. Family called EMS, and pt BIBA. Patient repeating same words over and over again, stating he is fine and getting agitated that he is the hospital. As per son, pt continues to make up stories during conversation which started today. He notes father said that he had to sign over a business, when in fact, the patient does not have any business. He also was telling the son about an argument that they had which never occurred. Son states father was A&Ox3 yesterday and in usual state of health. Of note, patient with similar presentation in past, during last CVA. Son unsure of when last CVA was, but states father was hospitalized at [facility]. He had no residual weakness after previous stroke. As per son, patient was in usual state of health yesterday but has been noncompliant with meds for the past 2 weeks. Son, Jose E, does not live with patient. Told to speak to step brother, Hector.   Spoke to son, Hector Alston, over telephone at [phone]. He states father was incoherent in the morning and had the episode of blankly staring at the wall. When he began to respond again, he was slightly agitated and he could not tell who the son was. He states this was similar to past stroke in 2018/2019. Said patient is noncompliant with medication use, and only uses it when he feels like taking medications. Further information can be obtained from [facility] (Kazakh speaking only) at [phone].   In the ED:   Vital Signs: T(F): 97.6, HR: 107 -->86, BP: 203/66 -->139/78, RR: 18, SpO2: 99% on RA  Labs Significant for: WBC 11.65, BUN/Cr - 28/1.40, lactate 2.1, TSH 4.45, Glucose 291, CKMB 3.9, Pro-, UA: small blood, glucose, and occasional bacteria  EKG: Sinus Tach 113bpm, with PVCs; Possible left atrial enlargement, QTc 427ms  CT Head: Mild chronic microvascular changes without evidence of an acute transcortical infarction or hemorrhage. Consider MRI as clinically warranted.  CXR: clear lungs.   In ED pt received, 2L NaCl IV bolus, Haldol 2.5 mg x1, ativan 1mg x1  (06 Dec 2020 15:15)      PAST MEDICAL & SURGICAL HISTORY:  HTN (hypertension)    CAD (coronary artery disease)  s/p CABG x4    Dementia    DM2 (diabetes mellitus, type 2)    H/O: CVA (cerebrovascular accident)  2018/2019    S/P CABG x 4        SOCIAL HISTORY:   Hx obtained from sonJose E  Tobacco: 1980s-2010, unaware of how much but states few cigarettes a day. Quit 10 years ago.   EtOH: denies  Recreational drug use: denies      FAMILY HISTORY:  No pertinent family history in first degree relatives.     No Pertinent Family History in first degree relatives of: HTN, DM, CVA.        Home Medications:  amLODIPine 10 mg oral tablet: 1 tab(s) orally once a day (06 Dec 2020 16:52)  atorvastatin 40 mg oral tablet: 1 tab(s) orally once a day (06 Dec 2020 16:52)  donepezil 10 mg oral tablet: 1 tab(s) orally once a day (at bedtime) (06 Dec 2020 16:52)  glimepiride 2 mg oral tablet: 1 tab(s) orally once a day (06 Dec 2020 16:52)  levothyroxine 100 mcg (0.1 mg) oral tablet: 1 tab(s) orally once a day (06 Dec 2020 16:52)  metFORMIN 850 mg oral tablet: 1 tab(s) orally 3 times a day (06 Dec 2020 16:52)  NovoLOG FlexPen 100 units/mL injectable solution: dose(s) injectable once a day (06 Dec 2020 16:52)      MEDICATIONS  (STANDING):  amLODIPine   Tablet 10 milliGRAM(s) Oral daily  aspirin enteric coated 81 milliGRAM(s) Oral daily  atorvastatin 40 milliGRAM(s) Oral at bedtime  dextrose 40% Gel 15 Gram(s) Oral once  dextrose 5%. 1000 milliLiter(s) (50 mL/Hr) IV Continuous <Continuous>  dextrose 5%. 1000 milliLiter(s) (100 mL/Hr) IV Continuous <Continuous>  dextrose 50% Injectable 25 Gram(s) IV Push once  dextrose 50% Injectable 12.5 Gram(s) IV Push once  dextrose 50% Injectable 25 Gram(s) IV Push once  donepezil 10 milliGRAM(s) Oral at bedtime  enoxaparin Injectable 40 milliGRAM(s) SubCutaneous daily  glucagon  Injectable 1 milliGRAM(s) IntraMuscular once  insulin glargine Injectable (LANTUS) 10 Unit(s) SubCutaneous at bedtime  insulin lispro (ADMELOG) corrective regimen sliding scale   SubCutaneous three times a day before meals  insulin lispro (ADMELOG) corrective regimen sliding scale   SubCutaneous at bedtime  levothyroxine 100 MICROGram(s) Oral daily  sodium chloride 0.9%. 1000 milliLiter(s) (115 mL/Hr) IV Continuous <Continuous>    MEDICATIONS  (PRN):  LORazepam   Injectable 1 milliGRAM(s) IV Push every 6 hours PRN Agitation      Allergies    No Known Allergies    Intolerances        REVIEW OF SYSTEMS: Unable to obtain 2/2 AMS.     VITAL SIGNS:   Vital Signs Last 24 Hrs  T(C): 36.7 (07 Dec 2020 07:32), Max: 36.9 (06 Dec 2020 19:20)  T(F): 98.1 (07 Dec 2020 07:32), Max: 98.5 (06 Dec 2020 19:20)  HR: 83 (07 Dec 2020 07:32) (74 - 107)  BP: 160/84 (07 Dec 2020 07:32) (139/78 - 203/66)  BP(mean): --  RR: 15 (07 Dec 2020 07:32) (15 - 18)  SpO2: 99% (07 Dec 2020 07:32) (95% - 100%)    I&O's Summary    06 Dec 2020 07:01  -  07 Dec 2020 07:00  --------------------------------------------------------  IN: 0 mL / OUT: 900 mL / NET: -900 mL        PHYSICAL EXAM:  Constitutional: NAD, appears confused, well-developed  HEENT NC/AT, moist mucous membranes  Pulmonary: Non-labored, breath sounds are clear bilaterally, no wheezing, rales or rhonchi  Cardiovascular: +S1, S2, RRR, no murmur  Gastrointestinal: Soft, nontender, nondistended, normoactive bowel sounds  Extremities: No peripheral edema   Neurological: A & O x 1 (name), moving all four extremities grossly   Skin: No obvious lesions/rashes    LABS: All Labs Reviewed:                        12.5   8.52  )-----------( 310      ( 07 Dec 2020 06:21 )             36.8                         12.3   9.60  )-----------( 305      ( 06 Dec 2020 18:07 )             35.9                         13.3   11.65 )-----------( 346      ( 06 Dec 2020 12:45 )             39.4     07 Dec 2020 06:11    140    |  107    |  16     ----------------------------<  160    4.1     |  25     |  1.00   06 Dec 2020 12:45    137    |  104    |  28     ----------------------------<  291    4.0     |  22     |  1.40     Ca    8.5        07 Dec 2020 06:11  Ca    8.9        06 Dec 2020 12:45  Phos  2.8       07 Dec 2020 06:11  Mg     1.6       07 Dec 2020 06:11  Mg     1.7       06 Dec 2020 12:45    TPro  6.7    /  Alb  2.9    /  TBili  0.6    /  DBili  x      /  AST  24     /  ALT  28     /  AlkPhos  107    07 Dec 2020 06:11  TPro  7.9    /  Alb  3.4    /  TBili  0.5    /  DBili  x      /  AST  20     /  ALT  30     /  AlkPhos  114    06 Dec 2020 12:45    PT/INR - ( 06 Dec 2020 12:45 )   PT: 12.0 sec;   INR: 1.03 ratio         PTT - ( 06 Dec 2020 12:45 )  PTT:30.5 sec  CARDIAC MARKERS ( 06 Dec 2020 12:45 )  <.015 ng/mL / x     / x     / x     / 3.9 ng/mL      Blood Culture:   12-06 @ 12:45  Pro Bnp 218    12-07 @ 06:11  TSH: 3.74  12-06 @ 12:45  TSH: 4.45      EKG: sinus tach 113 bpm, with PVCs, QTc 427 ms, left atrial enlargement     RADIOLOGY: CT Head: Mild chronic microvascular changes without evidence of an acute transcortical infarction or hemorrhage.     CXR: clear lungs   [facility]         Karishma Trujillo, Raine Gottlieb, Clinton Chan Savella        [phone] (office)    Reason for Consult: sinus tachycardia     Interval HPI:  ID [id number] Charly   Patient seen and examined at bedside. No acute events overnight. Pt appeared mildly agitated and confused. Patient continued to say it is Sunday for almost all questions. ROS unable to be obtained 2/2 AMS.     HPI:  The patient is a 70 male with PMHx of CVA ([id number]), dementia, hypothyroidism, CAD s/p CABGx4, DM2, HTN who presented to the ED for altered mental status. Son, Jose E Alston at bedside provided history. As per son, patient was dizzy in the morning, was sat down and he blankly stared for five minute duration. When he came back to, he was confused and agitated. Family called EMS, and pt BIBA. Patient repeating same words over and over again, stating he is fine and getting agitated that he is the hospital. As per son, pt continues to make up stories during conversation which started today. He notes father said that he had to sign over a business, when in fact, the patient does not have any business. He also was telling the son about an argument that they had which never occurred. Son states father was A&Ox3 yesterday and in usual state of health. Of note, patient with similar presentation in past, during last CVA. Son unsure of when last CVA was, but states father was hospitalized at [facility]. He had no residual weakness after previous stroke. As per son, patient was in usual state of health yesterday but has been noncompliant with meds for the past 2 weeks. Son, Jose E, does not live with patient. Told to speak to step brother, Hector.   Spoke to son, Hector Alston, over telephone at [phone]. He states father was incoherent in the morning and had the episode of blankly staring at the wall. When he began to respond again, he was slightly agitated and he could not tell who the son was. He states this was similar to past stroke in 2018/2019. Said patient is noncompliant with medication use, and only uses it when he feels like taking medications. Further information can be obtained from [facility] ([place]aguayan speaking only) at [phone].   In the ED:   Vital Signs: T(F): 97.6, HR: 107 -->86, BP: 203/66 -->139/78, RR: 18, SpO2: 99% on RA  Labs Significant for: WBC 11.65, BUN/Cr - 28/1.40, lactate 2.1, TSH 4.45, Glucose 291, CKMB 3.9, Pro-, UA: small blood, glucose, and occasional bacteria  EKG: Sinus Tach 113bpm, with PVCs; Possible left atrial enlargement, QTc 427ms  CT Head: Mild chronic microvascular changes without evidence of an acute transcortical infarction or hemorrhage. Consider MRI as clinically warranted.  CXR: clear lungs.   In ED pt received, 2L NaCl IV bolus, Haldol 2.5 mg x1, ativan 1mg x1  (06 Dec 2020 15:15)      PAST MEDICAL & SURGICAL HISTORY:  HTN (hypertension)    CAD (coronary artery disease)  s/p CABG x4    Dementia    DM2 (diabetes mellitus, type 2)    H/O: CVA (cerebrovascular accident)  2018/2019    S/P CABG x 4        SOCIAL HISTORY:   Hx obtained from sonJose E  Tobacco: 1980s-2010, unaware of how much but states few cigarettes a day. Quit 10 years ago.   EtOH: denies  Recreational drug use: denies      FAMILY HISTORY:  No pertinent family history in first degree relatives.     No Pertinent Family History in first degree relatives of: HTN, DM, CVA.        Home Medications:  amLODIPine 10 mg oral tablet: 1 tab(s) orally once a day (06 Dec 2020 16:52)  atorvastatin 40 mg oral tablet: 1 tab(s) orally once a day (06 Dec 2020 16:52)  donepezil 10 mg oral tablet: 1 tab(s) orally once a day (at bedtime) (06 Dec 2020 16:52)  glimepiride 2 mg oral tablet: 1 tab(s) orally once a day (06 Dec 2020 16:52)  levothyroxine 100 mcg (0.1 mg) oral tablet: 1 tab(s) orally once a day (06 Dec 2020 16:52)  metFORMIN 850 mg oral tablet: 1 tab(s) orally 3 times a day (06 Dec 2020 16:52)  NovoLOG FlexPen 100 units/mL injectable solution: dose(s) injectable once a day (06 Dec 2020 16:52)      MEDICATIONS  (STANDING):  amLODIPine   Tablet 10 milliGRAM(s) Oral daily  aspirin enteric coated 81 milliGRAM(s) Oral daily  atorvastatin 40 milliGRAM(s) Oral at bedtime  dextrose 40% Gel 15 Gram(s) Oral once  dextrose 5%. 1000 milliLiter(s) (50 mL/Hr) IV Continuous <Continuous>  dextrose 5%. 1000 milliLiter(s) (100 mL/Hr) IV Continuous <Continuous>  dextrose 50% Injectable 25 Gram(s) IV Push once  dextrose 50% Injectable 12.5 Gram(s) IV Push once  dextrose 50% Injectable 25 Gram(s) IV Push once  donepezil 10 milliGRAM(s) Oral at bedtime  enoxaparin Injectable 40 milliGRAM(s) SubCutaneous daily  glucagon  Injectable 1 milliGRAM(s) IntraMuscular once  insulin glargine Injectable (LANTUS) 10 Unit(s) SubCutaneous at bedtime  insulin lispro (ADMELOG) corrective regimen sliding scale   SubCutaneous three times a day before meals  insulin lispro (ADMELOG) corrective regimen sliding scale   SubCutaneous at bedtime  levothyroxine 100 MICROGram(s) Oral daily  sodium chloride 0.9%. 1000 milliLiter(s) (115 mL/Hr) IV Continuous <Continuous>    MEDICATIONS  (PRN):  LORazepam   Injectable 1 milliGRAM(s) IV Push every 6 hours PRN Agitation      Allergies    No Known Allergies    Intolerances        REVIEW OF SYSTEMS: Unable to obtain 2/2 AMS.     VITAL SIGNS:   Vital Signs Last 24 Hrs  T(C): 36.7 (07 Dec 2020 07:32), Max: 36.9 (06 Dec 2020 19:20)  T(F): 98.1 (07 Dec 2020 07:32), Max: 98.5 (06 Dec 2020 19:20)  HR: 83 (07 Dec 2020 07:32) (74 - 107)  BP: 160/84 (07 Dec 2020 07:32) (139/78 - 203/66)  BP(mean): --  RR: 15 (07 Dec 2020 07:32) (15 - 18)  SpO2: 99% (07 Dec 2020 07:32) (95% - 100%)    I&O's Summary    06 Dec 2020 07:01  -  07 Dec 2020 07:00  --------------------------------------------------------  IN: 0 mL / OUT: 900 mL / NET: -900 mL        PHYSICAL EXAM:  Constitutional: NAD, appears confused, well-developed  HEENT NC/AT, moist mucous membranes  Pulmonary: Non-labored, breath sounds are clear bilaterally, no wheezing, rales or rhonchi  Cardiovascular: +S1, S2, RRR, no murmur  Gastrointestinal: Soft, nontender, nondistended, normoactive bowel sounds  Extremities: No peripheral edema   Neurological: A & O x 1 (name), moving all four extremities grossly   Skin: No obvious lesions/rashes    LABS: All Labs Reviewed:                        12.5   8.52  )-----------( 310      ( 07 Dec 2020 06:21 )             36.8                         12.3   9.60  )-----------( 305      ( 06 Dec 2020 18:07 )             35.9                         13.3   11.65 )-----------( 346      ( 06 Dec 2020 12:45 )             39.4     07 Dec 2020 06:11    140    |  107    |  16     ----------------------------<  160    4.1     |  25     |  1.00   06 Dec 2020 12:45    137    |  104    |  28     ----------------------------<  291    4.0     |  22     |  1.40     Ca    8.5        07 Dec 2020 06:11  Ca    8.9        06 Dec 2020 12:45  Phos  2.8       07 Dec 2020 06:11  Mg     1.6       07 Dec 2020 06:11  Mg     1.7       06 Dec 2020 12:45    TPro  6.7    /  Alb  2.9    /  TBili  0.6    /  DBili  x      /  AST  24     /  ALT  28     /  AlkPhos  107    07 Dec 2020 06:11  TPro  7.9    /  Alb  3.4    /  TBili  0.5    /  DBili  x      /  AST  20     /  ALT  30     /  AlkPhos  114    06 Dec 2020 12:45    PT/INR - ( 06 Dec 2020 12:45 )   PT: 12.0 sec;   INR: 1.03 ratio         PTT - ( 06 Dec 2020 12:45 )  PTT:30.5 sec  CARDIAC MARKERS ( 06 Dec 2020 12:45 )  <.015 ng/mL / x     / x     / x     / 3.9 ng/mL      Blood Culture:   12-06 @ 12:45  Pro Bnp 218    12-07 @ 06:11  TSH: 3.74  12-06 @ 12:45  TSH: 4.45      EKG: sinus tach 113 bpm, 2 fusion beats, QTc 427 ms, left atrial enlargement     RADIOLOGY: CT Head: Mild chronic microvascular changes without evidence of an acute transcortical infarction or hemorrhage.     CXR: clear lungs

## 2020-12-07 NOTE — PROGRESS NOTE ADULT - PROBLEM SELECTOR PLAN 1
cont lantus 10 units qhs  cont mod dose admelog scale coverage qac/qhs  cont cons cho diet  goal bg 100-180 in hosp setting

## 2020-12-07 NOTE — GOALS OF CARE CONVERSATION - ADVANCED CARE PLANNING - CONVERSATION DETAILS
Writer met spoke to Karley on phone, pt has hcp, Karley is hcp. Reviewed patient's medical and social history as well as events leading to patient's hospitalization. Writer discussed patient's current diagnosis (AMD, metabolic encephalopathy vs TIA, CVA, dementia, DM), medical condition and management,   prognosis, and life expectancy. Inquired about patient's wishes regarding extent of medical care to be provided including escalation of medical care into the ICU and use of vasopressor support. In addition, the writer inquired about thoughts regarding cardiopulmonary resuscitation, artificial nutrition and hydration including use of feeding tubes and IVF, antibiotics. Karley showed good insight into pt medical conditions. All questions answered. Contact # for neurologist , Dr Pavon will be contacting Karley, contact # for [facility] RN and ER given. Pt is a full code at this time, will consider other plan as circumstances present. Aware pt had swallow eval and at present pt is NPO,  PC RN will follow Psychosocial support provided.

## 2020-12-07 NOTE — PROGRESS NOTE ADULT - PROBLEM SELECTOR PLAN 5
On admission, EKG Sinus Tachy 113. Likely 2/2 agitation vs infection   - WBC 11.65, Neutrophil 9.6, however no clear source. UA with no leuks or WBC or nitrites. f/u blood cultures, urine culture  - patient agitated during interview could contribute to tachycardia on admission.   - remote tele monitoring  - Cardiology, [facility] consulted; f/u recs

## 2020-12-07 NOTE — SWALLOW BEDSIDE ASSESSMENT ADULT - SWALLOW EVAL: PROGNOSIS
DIAGNOSIS CONT: upon digital palpation resulting in multiple swallows suggestive of pharyngeal stasis and/or airway penetration and throat clearing suggestive of airway penetration.                                                                                                                                                                                                                                            PROGNOSIS: Good for recommended diet

## 2020-12-07 NOTE — CONSULT NOTE ADULT - ASSESSMENT
Patient is 70 male with PMHx of CVA ([phone]), dementia,hypothyroidism, CAD s/p CABGx4, DM2, HTN admitted for AMS, consulted for sinus tachycardia.     Sinus tachycardia  -suspect 2/2 agitation (patient climbing out of bed) in setting of TIA, patient climbing out of bed unsure where he is, A & O x 1 in setting of suspected TIA, rule out infection    -EKG sinus tachycardia 113 bpm, borderline LAE, QTc 427  -no hyperthyroidism noted on thyroid panel, TSH high   monitor on remote tele for arrythmia in setting of suspected TIA      HTN urgency     - No clear evidence of acute ischemia, trop negative x 1, ekg ____   -appears euvolemic on exam,     -no sign of arrythmia       - Other cardiovascular workup will depend on clinical course.  - All other workup per primary team.  - Will continue to follow.             Patient is 70 male with PMHx of CVA ([phone]), dementia,hypothyroidism, CAD s/p CABGx4, DM2, HTN admitted for AMS, consulted for sinus tachycardia.     Sinus tachycardia  -suspect 2/2 agitation (patient climbing out of bed) in setting of TIA, patient climbing out of bed unsure where he is, A & O x 1 in setting of suspected TIA, rule out infection   -improved   -EKG sinus tachycardia 113 bpm, borderline LAE, 2 fusion beats, QTc 427  -no hyperthyroidism noted on thyroid panel, doubt thyroid etiology   -monitor on remote tele for arrythmia given 2 fusion beats  -f/u carotid dopplers, TTE, continue aspirin 81, home high dose statin      HTN urgency, 2/2 medication noncompliance  resolved latest /78  Amlodipine is not the ideal antihypertensive medication. Given fusion beats recommend start beta blocker, stop amlodipine. Given type 2 diabetes with proteinuria, CAD, and HTN recommend ACE/ARB (once WILLIAM resolved) with beta blocker (given PVCs/fusion beats).    - No clear evidence of acute ischemia, trop negative x 1  -EKG sinus tachycardia 113 bpm, borderline LAE, 2 fusion beats, QTc 427    -appears euvolemic on exam,     -no sign of arrythmia       - Other cardiovascular workup will depend on clinical course.  - All other workup per primary team.  - Will continue to follow.             Patient is 70 male with PMHx of CVA ([phone]), dementia,hypothyroidism, CAD s/p CABGx4, DM2, HTN admitted for AMS, consulted for sinus tachycardia.     Sinus tachycardia  -suspect 2/2 agitation (patient climbing out of bed) in setting of TIA, patient climbing out of bed unsure where he is, A & O x 1 in setting of suspected TIA, rule out infection   -improved   -EKG sinus tachycardia 113 bpm, borderline LAE, 2 fusion beats, QTc 427  -no hyperthyroidism noted on thyroid panel, doubt thyroid etiology   -monitor on remote tele for arrythmia given 2 fusion beats  -f/u carotid dopplers, TTE, continue aspirin 81, home high dose statin      HTN urgency, 2/2 medication noncompliance  resolved latest /78  Amlodipine is not the ideal antihypertensive medication. Given fusion beats recommend start beta blocker, stop amlodipine. Given type 2 diabetes with proteinuria, CAD, and HTN recommend ACE/ARB (once WILLIAM resolved) with beta blocker (given fusion beats).    - No clear evidence of acute ischemia, trop negative x 1  -EKG sinus tachycardia 113 bpm, borderline LAE, 2 fusion beats, QTc 427    -appears euvolemic on exam,     -no sign of arrythmia       - Other cardiovascular workup will depend on clinical course.  - All other workup per primary team.  - Will continue to follow.

## 2020-12-08 LAB
ANION GAP SERPL CALC-SCNC: 9 MMOL/L — SIGNIFICANT CHANGE UP (ref 5–17)
BUN SERPL-MCNC: 12 MG/DL — SIGNIFICANT CHANGE UP (ref 7–23)
CALCIUM SERPL-MCNC: 8.4 MG/DL — LOW (ref 8.5–10.1)
CHLORIDE SERPL-SCNC: 110 MMOL/L — HIGH (ref 96–108)
CO2 SERPL-SCNC: 24 MMOL/L — SIGNIFICANT CHANGE UP (ref 22–31)
CREAT SERPL-MCNC: 0.94 MG/DL — SIGNIFICANT CHANGE UP (ref 0.5–1.3)
GLUCOSE SERPL-MCNC: 163 MG/DL — HIGH (ref 70–99)
HCT VFR BLD CALC: 37.8 % — LOW (ref 39–50)
HGB BLD-MCNC: 13.2 G/DL — SIGNIFICANT CHANGE UP (ref 13–17)
MAGNESIUM SERPL-MCNC: 1.6 MG/DL — SIGNIFICANT CHANGE UP (ref 1.6–2.6)
MCHC RBC-ENTMCNC: 29.3 PG — SIGNIFICANT CHANGE UP (ref 27–34)
MCHC RBC-ENTMCNC: 34.9 GM/DL — SIGNIFICANT CHANGE UP (ref 32–36)
MCV RBC AUTO: 84 FL — SIGNIFICANT CHANGE UP (ref 80–100)
NRBC # BLD: 0 /100 WBCS — SIGNIFICANT CHANGE UP (ref 0–0)
PHOSPHATE SERPL-MCNC: 3.3 MG/DL — SIGNIFICANT CHANGE UP (ref 2.5–4.5)
PLATELET # BLD AUTO: 312 K/UL — SIGNIFICANT CHANGE UP (ref 150–400)
POTASSIUM SERPL-MCNC: 3.7 MMOL/L — SIGNIFICANT CHANGE UP (ref 3.5–5.3)
POTASSIUM SERPL-SCNC: 3.7 MMOL/L — SIGNIFICANT CHANGE UP (ref 3.5–5.3)
RBC # BLD: 4.5 M/UL — SIGNIFICANT CHANGE UP (ref 4.2–5.8)
RBC # FLD: 14 % — SIGNIFICANT CHANGE UP (ref 10.3–14.5)
SODIUM SERPL-SCNC: 143 MMOL/L — SIGNIFICANT CHANGE UP (ref 135–145)
WBC # BLD: 8.05 K/UL — SIGNIFICANT CHANGE UP (ref 3.8–10.5)
WBC # FLD AUTO: 8.05 K/UL — SIGNIFICANT CHANGE UP (ref 3.8–10.5)

## 2020-12-08 PROCEDURE — 99233 SBSQ HOSP IP/OBS HIGH 50: CPT

## 2020-12-08 PROCEDURE — 99232 SBSQ HOSP IP/OBS MODERATE 35: CPT

## 2020-12-08 RX ORDER — HALOPERIDOL DECANOATE 100 MG/ML
2 INJECTION INTRAMUSCULAR EVERY 8 HOURS
Refills: 0 | Status: DISCONTINUED | OUTPATIENT
Start: 2020-12-08 | End: 2020-12-10

## 2020-12-08 RX ADMIN — ATORVASTATIN CALCIUM 40 MILLIGRAM(S): 80 TABLET, FILM COATED ORAL at 21:38

## 2020-12-08 RX ADMIN — Medication 1 MILLIGRAM(S): at 01:23

## 2020-12-08 RX ADMIN — Medication 100 MICROGRAM(S): at 06:05

## 2020-12-08 RX ADMIN — ENOXAPARIN SODIUM 40 MILLIGRAM(S): 100 INJECTION SUBCUTANEOUS at 12:11

## 2020-12-08 RX ADMIN — Medication 4: at 17:00

## 2020-12-08 RX ADMIN — DONEPEZIL HYDROCHLORIDE 10 MILLIGRAM(S): 10 TABLET, FILM COATED ORAL at 21:38

## 2020-12-08 RX ADMIN — Medication 12.5 MILLIGRAM(S): at 18:53

## 2020-12-08 RX ADMIN — Medication 2: at 11:21

## 2020-12-08 RX ADMIN — Medication 2: at 07:55

## 2020-12-08 RX ADMIN — INSULIN GLARGINE 10 UNIT(S): 100 INJECTION, SOLUTION SUBCUTANEOUS at 21:38

## 2020-12-08 RX ADMIN — Medication 12.5 MILLIGRAM(S): at 06:05

## 2020-12-08 RX ADMIN — Medication 81 MILLIGRAM(S): at 12:11

## 2020-12-08 RX ADMIN — HALOPERIDOL DECANOATE 2 MILLIGRAM(S): 100 INJECTION INTRAMUSCULAR at 18:53

## 2020-12-08 NOTE — PROGRESS NOTE ADULT - PROBLEM SELECTOR PLAN 4
Chronic, on amlodipine at home. Medication noncompliance  - On admission, /66 on admission.   - Home medication: amlodipine 10mg QD discontinued and started on metoprolol per cardio recs. Consider adding ACEi/ARB per cardio recs.

## 2020-12-08 NOTE — PHYSICAL THERAPY INITIAL EVALUATION ADULT - PERTINENT HX OF CURRENT PROBLEM, REHAB EVAL
70 male PMH CVA, memory loss, dementia, CAD CABGx4, DM2, presents to ER by ambulance with report of altered mental status. Patient provides no information, Son states patient has been getting more agitated, paranoid, refusing his medications at home, thinks he is going to be poisoned.

## 2020-12-08 NOTE — PROGRESS NOTE ADULT - SUBJECTIVE AND OBJECTIVE BOX
CAPILLARY BLOOD GLUCOSE      POCT Blood Glucose.: 156 mg/dL (08 Dec 2020 07:42)  POCT Blood Glucose.: 182 mg/dL (07 Dec 2020 21:22)  POCT Blood Glucose.: 148 mg/dL (07 Dec 2020 15:29)  POCT Blood Glucose.: 131 mg/dL (07 Dec 2020 12:14)      Vital Signs Last 24 Hrs  T(C): 36.4 (07 Dec 2020 21:07), Max: 36.7 (07 Dec 2020 18:02)  T(F): 97.6 (07 Dec 2020 21:07), Max: 98 (07 Dec 2020 18:02)  HR: 88 (07 Dec 2020 21:07) (88 - 99)  BP: 164/80 (07 Dec 2020 21:07) (149/80 - 164/80)  BP(mean): --  RR: 16 (07 Dec 2020 21:07) (15 - 17)  SpO2: 96% (07 Dec 2020 21:07) (96% - 99%)    General: WN/WD NAD  Respiratory: CTA B/L  CV: RRR, S1S2, no murmurs, rubs or gallops  Abdominal: Soft, NT, ND +BS, Last BM  Extremities: No edema, + peripheral pulses     12-08    143  |  110<H>  |  12  ----------------------------<  163<H>  3.7   |  24  |  0.94    Ca    8.4<L>      08 Dec 2020 07:00  Phos  3.3     12-08  Mg     1.6     12-08    TPro  6.7  /  Alb  2.9<L>  /  TBili  0.6  /  DBili  x   /  AST  24  /  ALT  28  /  AlkPhos  107  12-07      atorvastatin 40 milliGRAM(s) Oral at bedtime  dextrose 40% Gel 15 Gram(s) Oral once  dextrose 50% Injectable 25 Gram(s) IV Push once  dextrose 50% Injectable 12.5 Gram(s) IV Push once  dextrose 50% Injectable 25 Gram(s) IV Push once  glucagon  Injectable 1 milliGRAM(s) IntraMuscular once  insulin glargine Injectable (LANTUS) 10 Unit(s) SubCutaneous at bedtime  insulin lispro (ADMELOG) corrective regimen sliding scale   SubCutaneous three times a day before meals  insulin lispro (ADMELOG) corrective regimen sliding scale   SubCutaneous at bedtime  levothyroxine 100 MICROGram(s) Oral daily

## 2020-12-08 NOTE — PROGRESS NOTE ADULT - PROBLEM SELECTOR PLAN 3
Chronic, medication noncompliance, on metformin, glimiperide, and insulin at home  - On admission POCT 361, 341; Serum Glucose 291  - hold home meds  - f/u A1C- 7.9%  - low dose ISS and lantus  - Hypoglycemia protocol  - Endocrinology, Dr. Perlman, consulted; f/u recs

## 2020-12-08 NOTE — CHART NOTE - NSCHARTNOTEFT_GEN_A_CORE
Called by RN for 11 beats of V-tach. Pt asymptomatic. Pt reverted back to sinus rhythm. F/u AM Mg and Phos. Will continue to monitor. RN to call with any changes.    Vital Signs Last 24 Hrs  T(C): 36.4 (07 Dec 2020 21:07), Max: 36.7 (07 Dec 2020 07:32)  T(F): 97.6 (07 Dec 2020 21:07), Max: 98.1 (07 Dec 2020 07:32)  HR: 88 (07 Dec 2020 21:07) (81 - 99)  BP: 164/80 (07 Dec 2020 21:07) (149/80 - 164/80)  BP(mean): --  RR: 16 (07 Dec 2020 21:07) (15 - 17)  SpO2: 96% (07 Dec 2020 21:07) (96% - 99%)

## 2020-12-08 NOTE — PROGRESS NOTE ADULT - PROBLEM SELECTOR PLAN 1
Reza called and said when he was here last week - he and TGF discussed a higher strength gabapentin     He is currently out please call him and discuss          Metabolic encephalopathy likely multifactorial, TIA vs CVA, hypertensive urgency due to medication noncompliance, hyperglycemia  - In ED, received Haldol 2.5, Ativan 1mg, and 2L IVF Bolus  - CT Head: Mild chronic microvascular changes without evidence of an acute transcortical infarction or hemorrhage  - MRI brain showed no acute infarcts  -CXR- clear lungs.   - f/u Thyroid panel, Lipid Panel, A1C, B12, folate, ammonia  - Carotid dopplers - no stenosis  - f/u TTE - Normal left ventricular internal dimensions and systolic function, estimated LVEF of 65%.  - ASA 325mg x1. c/w ASA 81mg QD. Continue with home high dose statin.   - Diet: dysphagia 2 +nectar thick recommended by swallow eval  - neuro recs appreciated. Discussed with neuro today, order EEG (might need sedation for it). Likely worsening of his dementia

## 2020-12-08 NOTE — PROGRESS NOTE ADULT - ASSESSMENT
70 male with PMHx of CVA ([phone]), dementia, CAD s/p CABGx4, DM2, HTN, BIBA 2/2 AMS and agitation, admitted for metabolic encephalopathy likely due to worsening dementia vs tia vs dehydration, adm for r/o cva and further work up.

## 2020-12-08 NOTE — PROGRESS NOTE ADULT - PROBLEM SELECTOR PLAN 2
Cr 1.4 ->1.0 ->0.94  Pt was in WILLIAM likely prerenal  Pt now eating well, d/c IVF for now. Cr 0.94 today.

## 2020-12-08 NOTE — PROGRESS NOTE ADULT - ASSESSMENT
70 male with PMHx of CVA ([phone]), dementia, hypothyroidism, CAD s/p CABG x 4, DM2, HTN admitted for AMS, consulted for sinus tachycardia.     Sinus tachycardia  - Tachycardia could be reactive.  Now resolved.  Maintaining NSR on tele  - EKG sinus tachycardia 113 bpm, borderline LAE, 2 fusion beats, QTc 427  - TSH normal  - Continue low dose BB for now    CVA  - MR head noted  - Neuro following  - TTE showed normal RV/LVF, EF 65% with no significant valvular disease  - Start ASA and continue statin    HTN urgency, 2/2 medication noncompliance  - BP now improved.  However, would allow permissive HTN for now  - Would continue low dose BB for now  - No evidence of volume overload    CAD s/p CABG  - Continue ASA, BB, and statin  - Would start ACEI/ARB when out of permissive HTN window  - Would not resume Norvasc at this point     DVT ppx  - Per Primary    - Other cardiovascular workup will depend on clinical course.  - All other workup per primary team.  - Will continue to follow.    Shea Duran DNP, NP-C  Cardiology   Spectra #[phone]

## 2020-12-08 NOTE — PROGRESS NOTE ADULT - PROBLEM SELECTOR PLAN 7
-continue home medications, levo 100mcg  -TSH elevated 4.45 on admission likely 2/2 medication noncompliance. (Another TSH obtained was WNL).

## 2020-12-08 NOTE — PROGRESS NOTE ADULT - SUBJECTIVE AND OBJECTIVE BOX
Neurology Follow up note    SURY [id number]    HPI:  The patient is a 70 male with PMHx of CVA ([phone]), dementia, hypothyroidism, CAD s/p CABGx4, DM2, HTN who presented to the ED for altered mental status. Son, Jose E Alston at bedside provided history. As per son, patient was dizzy in the morning, was sat down and he blankly stared for five minute duration. When he came back to, he was confused and agitated. Family called EMS, and pt BIBA. Patient repeating same words over and over again, stating he is fine and getting agitated that he is the hospital. As per son, pt continues to make up stories during conversation which started today. He notes father said that he had to sign over a business, when in fact, the patient does not have any business. He also was telling the son about an argument that they had which never occurred. Son states father was A&Ox3 yesterday and in usual state of health. Of note, patient with similar presentation in past, during last CVA. Son unsure of when last CVA was, but states father was hospitalized at [facility]. He had no residual weakness after previous stroke. As per son, patient was in usual state of health yesterday but has been noncompliant with meds for the past 2 weeks. Son, Jose E, does not live with patient. Told to speak to step brother, Hector.   Spoke to son, Hector Alston, over telephone at [phone]. He states father was inhorent in the morning and had the episode of blankly staring at the wall. When he began to respond again, he was slightly agitated and he could not tell who the son was. He states this was similar to past stroke in 2018/2019. Said patient is noncompliant with medication use, and only uses it when he feels like taking medications. Further information can be obtained from [facility] (Dominican speaking only) at [phone].   In the ED:   Vital Signs: T(F): 97.6, HR: 107 -->86, BP: 203/66 -->139/78, RR: 18, SpO2: 99% on RA  Labs Significant for: WBC 11.65, BUN/Cr - 28/1.40, lactate 2.1, TSH 4.45, Glucose 291, CKMB 3.9, Pro-, UA: small blood, glucose, and occasional bacteria  EKG: Sinus Tach 113bpm, with PVCs; Possible left atrial enlargement, QTc 427ms  CT Head: Mild chronic microvascular changes without evidence of an acute transcortical infarction or hemorrhage. Consider MRI as clinically warranted.  CXR: clear lungs.   In ED pt received, 2L NaCl IV bolus, Haldol 2.5 mg x1, ativan 1mg x1  (06 Dec 2020 15:15)      Interval History -confused intermittently    Patient is seen, chart was reviewed and case was discussed with the treatment team.  Pt is not in any distress.   Lying on bed comfortably.   No events reported overnight.     Vital Signs Last 24 Hrs  T(C): 36.6 (08 Dec 2020 12:49), Max: 36.7 (07 Dec 2020 18:02)  T(F): 97.8 (08 Dec 2020 12:49), Max: 98 (07 Dec 2020 18:02)  HR: 91 (08 Dec 2020 12:49) (88 - 91)  BP: 160/80 (08 Dec 2020 12:49) (153/83 - 164/80)  BP(mean): --  RR: 16 (08 Dec 2020 12:49) (16 - 17)  SpO2: 97% (08 Dec 2020 12:49) (96% - 98%)        REVIEW OF SYSTEMS:    Constitutional: No fever, w  Eyes: No eye pain, visual disturbances, or discharge  ENT:  No difficulty hearing, tinnitus, vertigo; No sinus or throat pain  Neck: No pain or stiffness  Respiratory: No wheezing, chills or hemoptysis  Cardiovascular: No chest pain, palpitations,   Gastrointestinal: No abdominal or epigastric pain. No nausea, vomiting or hematemesis;  Genitourinary: No dysuria, frequency, hematuria or incontinence  Neurological: No headaches,  Psychiatric: No d, mood swings or difficulty sleeping  Musculoskeletal: No joint pain or swelling; No muscle, back or extremity pain  Skin: No itching, burning, rashes or lesions   Lymph Nodes: No enlarged glands  Endocrine: No heat or cold intolerance; No hair loss,  Allergy and Immunologic: No hives or eczema    On Neurological Examination:    Mental Status - Pt is alert, awake, restless   Follows some simple commands  and able to answer questions appropriately    Speech - Pt has dysarthria.    Cranial Nerves - Pupils 3 mm equal and reactive to light, extraocular eye movements intact. Pt has no visual field deficit.  Pt has no facial asymmetry. Facial sensation is intact.Tongue - is in midline.    Muscle tone - is normal     Motor Exam - 4+/5 all over, No drift. No shaking or tremors.    Sensory Exam - . Pt withdraws all extremities equally on stimulation. No asymmetry seen. No complaints of tingling, numbness.      coordination:    Finger to nose: normal      Deep tendon Reflexes - 2 plus all over.        .    Neck Supple -  Yes.     MEDICATIONS    amLODIPine   Tablet 10 milliGRAM(s) Oral daily  aspirin enteric coated 81 milliGRAM(s) Oral daily  atorvastatin 40 milliGRAM(s) Oral at bedtime  dextrose 40% Gel 15 Gram(s) Oral once  dextrose 5%. 1000 milliLiter(s) IV Continuous <Continuous>  dextrose 5%. 1000 milliLiter(s) IV Continuous <Continuous>  dextrose 50% Injectable 25 Gram(s) IV Push once  dextrose 50% Injectable 12.5 Gram(s) IV Push once  dextrose 50% Injectable 25 Gram(s) IV Push once  donepezil 10 milliGRAM(s) Oral at bedtime  enoxaparin Injectable 40 milliGRAM(s) SubCutaneous daily  glucagon  Injectable 1 milliGRAM(s) IntraMuscular once  insulin glargine Injectable (LANTUS) 10 Unit(s) SubCutaneous at bedtime  insulin lispro (ADMELOG) corrective regimen sliding scale   SubCutaneous three times a day before meals  insulin lispro (ADMELOG) corrective regimen sliding scale   SubCutaneous at bedtime  levothyroxine 100 MICROGram(s) Oral daily  LORazepam   Injectable 1 milliGRAM(s) IV Push every 6 hours PRN  sodium chloride 0.9%. 1000 milliLiter(s) IV Continuous <Continuous>      Allergies    No Known Allergies    Intolerances                              13.2   8.05  )-----------( 312      ( 08 Dec 2020 07:00 )             37.8   12-08    143  |  110<H>  |  12  ----------------------------<  163<H>  3.7   |  24  |  0.94    Ca    8.4<L>      08 Dec 2020 07:00  Phos  3.3     12-08  Mg     1.6     12-08    TPro  6.7  /  Alb  2.9<L>  /  TBili  0.6  /  DBili  x   /  AST  24  /  ALT  28  /  AlkPhos  107  12-07    Lipid Panel 12-07 @ 08:48  Total Cholesterol, Serum 160  LDL --  Triglycerides 90    Vitamin B12 Vitamin B12, Serum: 724 pg/mL (12-06 @ 23:22)      RADIOLOGY    < from: MR Head No Cont (12.07.20 @ 11:40) >    EXAM:  MR BRAIN                            PROCEDURE DATE:  12/07/2020          INTERPRETATION:  CLINICAL STATEMENT: Pain.    TECHNIQUE: MRI of the brain was performed without gadolinium.    COMPARISON: CT head 12/6/2020.    FINDINGS:  There is moderate diffuse parenchymal volume loss. There are T2 prolongation signal abnormalities in the periventricular subcortical white matter likely related to mild chronic microvascular ischemic changes.    Chronic hemosiderin deposition left occipital lobe noted. Encephalomalacia/gliosis left temporal lobe with hemosiderin deposition    There is no acute parenchymal hemorrhage, parenchymal mass, mass effect or midline shift. There is no extra-axial fluid collection.  There is no hydrocephalus.  There isno acute infarct.    IMPRESSION:  No acute intracranial hemorrhage or acute infarct.            FREDDIE BHAT MD; Attending Radiologist  This document has been electronically signed. Dec  7 2020 11:57AM    ASSESSMENT AND PLAN:      seen for ams ; cva less likely as brain mri is unremarkable  hx of cva/dementia    EEG  continue asa/statin  Physical therapy evaluation.  OOB to chair/ambulation with assistance only.  Pain is accessed and addressed.  Would continue to follow.

## 2020-12-08 NOTE — PROGRESS NOTE ADULT - SUBJECTIVE AND OBJECTIVE BOX
[facility] -- Karishma Trujillo Grossman, [place], Clinton Chan [place], [place]  Office # [phone]    Follow Up: CVA, Hx of CAD s/p CABG     Subjective/Observations: Awake but confused and disoriented.  Very restless.  Laying flat on RA;.  Unable to obtain ROS but denies any discomfort.  NSR on tele    REVIEW OF SYSTEMS: All other review of systems is negative unless indicated above  PAST MEDICAL & SURGICAL HISTORY:  HTN (hypertension)    CAD (coronary artery disease)  s/p CABG x4    Dementia    DM2 (diabetes mellitus, type 2)    H/O: CVA (cerebrovascular accident)  2018/2019    S/P CABG x 4    MEDICATIONS  (STANDING):  aspirin enteric coated 81 milliGRAM(s) Oral daily  atorvastatin 40 milliGRAM(s) Oral at bedtime  dextrose 40% Gel 15 Gram(s) Oral once  dextrose 5%. 1000 milliLiter(s) (50 mL/Hr) IV Continuous <Continuous>  dextrose 5%. 1000 milliLiter(s) (100 mL/Hr) IV Continuous <Continuous>  dextrose 50% Injectable 25 Gram(s) IV Push once  dextrose 50% Injectable 12.5 Gram(s) IV Push once  dextrose 50% Injectable 25 Gram(s) IV Push once  donepezil 10 milliGRAM(s) Oral at bedtime  enoxaparin Injectable 40 milliGRAM(s) SubCutaneous daily  glucagon  Injectable 1 milliGRAM(s) IntraMuscular once  influenza   Vaccine 0.5 milliLiter(s) IntraMuscular once  insulin glargine Injectable (LANTUS) 10 Unit(s) SubCutaneous at bedtime  insulin lispro (ADMELOG) corrective regimen sliding scale   SubCutaneous three times a day before meals  insulin lispro (ADMELOG) corrective regimen sliding scale   SubCutaneous at bedtime  levothyroxine 100 MICROGram(s) Oral daily  metoprolol tartrate 12.5 milliGRAM(s) Oral two times a day  sodium chloride 0.9%. 1000 milliLiter(s) (115 mL/Hr) IV Continuous <Continuous>    MEDICATIONS  (PRN):    Allergies    No Known Allergies    Intolerances    Vital Signs Last 24 Hrs  T(C): 36.4 (07 Dec 2020 21:07), Max: 36.7 (07 Dec 2020 18:02)  T(F): 97.6 (07 Dec 2020 21:07), Max: 98 (07 Dec 2020 18:02)  HR: 88 (07 Dec 2020 21:07) (88 - 99)  BP: 164/80 (07 Dec 2020 21:07) (149/80 - 164/80)  BP(mean): --  RR: 16 (07 Dec 2020 21:07) (15 - 17)  SpO2: 96% (07 Dec 2020 21:07) (96% - 99%)  I&O's Summary    07 Dec 2020 07:01  -  08 Dec 2020 07:00  --------------------------------------------------------  IN: 0 mL / OUT: 400 mL / NET: -400 mL     PHYSICAL EXAM:  TELE: NSR  Constitutional: NAD, lethargic, well-developed  HEENT: Moist Mucous Membranes, Anicteric  Pulmonary: Non-labored, breath sounds are clear bilaterally, No wheezing, rales or rhonchi  Cardiovascular: Regular, S1 and S2, No murmurs, rubs, gallops or clicks  Gastrointestinal: Bowel Sounds present, soft, nontender.   Lymph: No peripheral edema. No lymphadenopathy.  Skin: No visible rashes or ulcers.  Psych:  Mood & affect:  flat, unable to assess  LABS: All Labs Reviewed:                        13.2   8.05  )-----------( 312      ( 08 Dec 2020 07:00 )             37.8                         12.5   8.52  )-----------( 310      ( 07 Dec 2020 06:21 )             36.8                         12.3   9.60  )-----------( 305      ( 06 Dec 2020 18:07 )             35.9     08 Dec 2020 07:00    143    |  110    |  12     ----------------------------<  163    3.7     |  24     |  0.94   07 Dec 2020 06:11    140    |  107    |  16     ----------------------------<  160    4.1     |  25     |  1.00   06 Dec 2020 12:45    137    |  104    |  28     ----------------------------<  291    4.0     |  22     |  1.40     Ca    8.4        08 Dec 2020 07:00  Ca    8.5        07 Dec 2020 06:11  Ca    8.9        06 Dec 2020 12:45  Phos  3.3       08 Dec 2020 07:00  Phos  2.8       07 Dec 2020 06:11  Mg     1.6       08 Dec 2020 07:00  Mg     1.6       07 Dec 2020 06:11  Mg     1.7       06 Dec 2020 12:45    TPro  6.7    /  Alb  2.9    /  TBili  0.6    /  DBili  x      /  AST  24     /  ALT  28     /  AlkPhos  107    07 Dec 2020 06:11  TPro  7.9    /  Alb  3.4    /  TBili  0.5    /  DBili  x      /  AST  20     /  ALT  30     /  AlkPhos  114    06 Dec 2020 12:45    PT/INR - ( 06 Dec 2020 12:45 )   PT: 12.0 sec;   INR: 1.03 ratio      PTT - ( 06 Dec 2020 12:45 )  PTT:30.5 sec  CARDIAC MARKERS ( 06 Dec 2020 12:45 )  <.015 ng/mL / x     / x     / x     / 3.9 ng/mL       EXAM:  ECHO TTE WO CON COMP W DOPP         PROCEDURE DATE:  12/07/2020        INTERPRETATION:  INDICATION: Coronary artery disease  Sonographer KL    Blood Pressure 160/84    Height 162 cm     Weight 72 kg       BSA 1.8 sq cm    Dimensions:  LA 3.4       Normal Values: 2.0 - 4.0 cm  Ao 3.8        Normal Values: 2.0 - 3.8 cm  SEPTUM 1.2       Normal Values: 0.6 - 1.2 cm  PWT 1.0       Normal Values: 0.6 - 1.1 cm  LVIDd 4.2         Normal Values: 3.0 - 5.6 cm  LVIDs 2.7         Normal Values: 1.8 - 4.0 cm      OBSERVATIONS:  Mitral Valve: normal, trace physiologic MR.  Aortic Valve/Aorta: Sclerotic trileaflet aortic valve with normal opening.  Tricuspid Valve: normal with trace TR.  Pulmonic Valve: Trace PI  Left Atrium: normal  Right Atrium: normal  Left Ventricle: normal LV size and systolic function, estimated LVEF of 65%.  Right Ventricle: normal size and systolic function.  Pericardium/Pleura: normal, no significant pericardial effusion.  LV diastolic dysfunction is present    Conclusion:  Normal left ventricular internal dimensions and systolic function, estimated LVEF of 65%.  Normal RV size and systolic function.  Sclerotic trileaflet aortic valve, without AI.  Trace physiologic MR and TR.      CAROLINE BARR MD; Attending Cardiologist  This document has been electronically signed. Dec  8 2020  8:56AM       EXAM:  XR CHEST PORTABLE URGENT 1V                          PROCEDURE DATE:  12/06/2020      INTERPRETATION:  HISTORY: Altered mental status    TECHNIQUE: A single portable view of the chest was obtained.    COMPARISON: None.    FINDINGS:  The cardiac silhouette is normal in size. The patient is status post median sternotomy and CABG. There are no focal consolidations or pleural effusions. The hilar and mediastinal structures appear unremarkable. The osseous structures are intact.    IMPRESSION: Clear lungs.    MARGARETH GIMENEZ MD; Attending Radiologist  This document has been electronically signed. Dec  6 2020  1:41PM      Ventricular Rate 113 BPM    Atrial Rate 113 BPM    P-R Interval 134 ms    QRS Duration 88 ms    Q-T Interval 312 ms    QTC Calculation(Bazett) 427 ms    P Axis 71 degrees    R Axis 48 degrees    T Axis 79 degrees    Diagnosis Line Sinus tachycardia with occasional premature ventricular complexes  Possible Left atrial enlargement  Septal infarct , age undetermined  Abnormal ECG  No previous ECGs available  Confirmed by Darnell Ni MD ([id number]) on 12/7/2020 1:01:45 PM      EXAM:  MR BRAIN                          PROCEDURE DATE:  12/07/2020      INTERPRETATION:  CLINICAL STATEMENT: Pain.    TECHNIQUE: MRI of the brain was performed without gadolinium.    COMPARISON: CT head 12/6/2020.    FINDINGS:  There is moderate diffuse parenchymal volume loss. There are T2 prolongation signal abnormalities in the periventricular subcortical white matter likely related to mild chronic microvascular ischemic changes.    Chronic hemosiderin deposition left occipital lobe noted. Encephalomalacia/gliosis left temporal lobe with hemosiderin deposition    There is no acute parenchymal hemorrhage, parenchymal mass, mass effect or midline shift. There is no extra-axial fluid collection.  There is no hydrocephalus.  There isno acute infarct.    IMPRESSION:  No acute intracranial hemorrhage or acute infarct.    FREDDIE BHAT MD; Attending Radiologist  This document has been electronically signed. Dec  7 2020 11:57AM

## 2020-12-08 NOTE — PROGRESS NOTE ADULT - SUBJECTIVE AND OBJECTIVE BOX
Patient is a 70y old  Male who presents with a chief complaint of AMS (08 Dec 2020 11:13)    INTERVAL HPI/OVERNIGHT EVENTS: Patient seen and examined at bedside. No overnight events occurred.   Accepted morning meds per nurse. Was being fed at bedside this morning and cooperating.     MEDICATIONS  (STANDING):  aspirin enteric coated 81 milliGRAM(s) Oral daily  atorvastatin 40 milliGRAM(s) Oral at bedtime  dextrose 40% Gel 15 Gram(s) Oral once  dextrose 5%. 1000 milliLiter(s) (50 mL/Hr) IV Continuous <Continuous>  dextrose 5%. 1000 milliLiter(s) (100 mL/Hr) IV Continuous <Continuous>  dextrose 50% Injectable 25 Gram(s) IV Push once  dextrose 50% Injectable 12.5 Gram(s) IV Push once  dextrose 50% Injectable 25 Gram(s) IV Push once  donepezil 10 milliGRAM(s) Oral at bedtime  enoxaparin Injectable 40 milliGRAM(s) SubCutaneous daily  glucagon  Injectable 1 milliGRAM(s) IntraMuscular once  influenza   Vaccine 0.5 milliLiter(s) IntraMuscular once  insulin glargine Injectable (LANTUS) 10 Unit(s) SubCutaneous at bedtime  insulin lispro (ADMELOG) corrective regimen sliding scale   SubCutaneous three times a day before meals  insulin lispro (ADMELOG) corrective regimen sliding scale   SubCutaneous at bedtime  levothyroxine 100 MICROGram(s) Oral daily  metoprolol tartrate 12.5 milliGRAM(s) Oral two times a day  sodium chloride 0.9%. 1000 milliLiter(s) (115 mL/Hr) IV Continuous <Continuous>    MEDICATIONS  (PRN):    Allergies    No Known Allergies    Intolerances    REVIEW OF SYSTEMS: unable to obtain ROS due to pts mental status    Vital Signs Last 24 Hrs  T(C): 36.6 (08 Dec 2020 12:49), Max: 36.7 (07 Dec 2020 18:02)  T(F): 97.8 (08 Dec 2020 12:49), Max: 98 (07 Dec 2020 18:02)  HR: 91 (08 Dec 2020 12:49) (88 - 99)  BP: 160/80 (08 Dec 2020 12:49) (149/89 - 164/80)  BP(mean): --  RR: 16 (08 Dec 2020 12:49) (15 - 17)  SpO2: 97% (08 Dec 2020 12:49) (96% - 99%)    PHYSICAL EXAM:  GENERAL: NAD  HEENT:  anicteric, moist mucous membranes  CHEST/LUNG:  CTA b/l, no rales, wheezes, or rhonchi  HEART:  RRR, S1, S2  ABDOMEN:  BS+, soft, nontender, nondistended  EXTREMITIES: no edema, cyanosis, or calf tenderness      LABS:                        13.2   8.05  )-----------( 312      ( 08 Dec 2020 07:00 )             37.8     CBC Full  -  ( 08 Dec 2020 07:00 )  WBC Count : 8.05 K/uL  Hemoglobin : 13.2 g/dL  Hematocrit : 37.8 %  Platelet Count - Automated : 312 K/uL  Mean Cell Volume : 84.0 fl  Mean Cell Hemoglobin : 29.3 pg  Mean Cell Hemoglobin Concentration : 34.9 gm/dL  Auto Neutrophil # : x  Auto Lymphocyte # : x  Auto Monocyte # : x  Auto Eosinophil # : x  Auto Basophil # : x  Auto Neutrophil % : x  Auto Lymphocyte % : x  Auto Monocyte % : x  Auto Eosinophil % : x  Auto Basophil % : x    08 Dec 2020 07:00    143    |  110    |  12     ----------------------------<  163    3.7     |  24     |  0.94     Ca    8.4        08 Dec 2020 07:00  Phos  3.3       08 Dec 2020 07:00  Mg     1.6       08 Dec 2020 07:00    CAPILLARY BLOOD GLUCOSE    POCT Blood Glucose.: 174 mg/dL (08 Dec 2020 11:13)  POCT Blood Glucose.: 156 mg/dL (08 Dec 2020 07:42)  POCT Blood Glucose.: 182 mg/dL (07 Dec 2020 21:22)  POCT Blood Glucose.: 148 mg/dL (07 Dec 2020 15:29)    Culture - Urine (collected 12-06-20 @ 17:55)  Source: .Urine Clean Catch (Midstream)  Final Report (12-07-20 @ 12:49):    <10,000 CFU/mL Normal Urogenital [place]    Culture - Blood (collected 12-06-20 @ 17:03)  Source: .Blood Blood-Peripheral  Preliminary Report (12-07-20 @ 18:01):    No growth to date.    Culture - Blood (collected 12-06-20 @ 17:03)  Source: .Blood Blood-Peripheral  Preliminary Report (12-07-20 @ 18:01):    No growth to date.        RADIOLOGY & ADDITIONAL TESTS:    Personally reviewed.     Consultant(s) Notes Reviewed:  [x] YES  [ ] NO

## 2020-12-09 LAB
ANION GAP SERPL CALC-SCNC: 8 MMOL/L — SIGNIFICANT CHANGE UP (ref 5–17)
BUN SERPL-MCNC: 14 MG/DL — SIGNIFICANT CHANGE UP (ref 7–23)
CALCIUM SERPL-MCNC: 8.7 MG/DL — SIGNIFICANT CHANGE UP (ref 8.5–10.1)
CHLORIDE SERPL-SCNC: 107 MMOL/L — SIGNIFICANT CHANGE UP (ref 96–108)
CO2 SERPL-SCNC: 27 MMOL/L — SIGNIFICANT CHANGE UP (ref 22–31)
CREAT SERPL-MCNC: 1.2 MG/DL — SIGNIFICANT CHANGE UP (ref 0.5–1.3)
GLUCOSE SERPL-MCNC: 185 MG/DL — HIGH (ref 70–99)
HCT VFR BLD CALC: 41 % — SIGNIFICANT CHANGE UP (ref 39–50)
HGB BLD-MCNC: 14.2 G/DL — SIGNIFICANT CHANGE UP (ref 13–17)
MAGNESIUM SERPL-MCNC: 1.8 MG/DL — SIGNIFICANT CHANGE UP (ref 1.6–2.6)
MCHC RBC-ENTMCNC: 29.1 PG — SIGNIFICANT CHANGE UP (ref 27–34)
MCHC RBC-ENTMCNC: 34.6 GM/DL — SIGNIFICANT CHANGE UP (ref 32–36)
MCV RBC AUTO: 84 FL — SIGNIFICANT CHANGE UP (ref 80–100)
MELD SCORE WITH DIALYSIS: 9 POINTS — SIGNIFICANT CHANGE UP
NRBC # BLD: 0 /100 WBCS — SIGNIFICANT CHANGE UP (ref 0–0)
PHOSPHATE SERPL-MCNC: 3.2 MG/DL — SIGNIFICANT CHANGE UP (ref 2.5–4.5)
PLATELET # BLD AUTO: 325 K/UL — SIGNIFICANT CHANGE UP (ref 150–400)
POTASSIUM SERPL-MCNC: 3.8 MMOL/L — SIGNIFICANT CHANGE UP (ref 3.5–5.3)
POTASSIUM SERPL-SCNC: 3.8 MMOL/L — SIGNIFICANT CHANGE UP (ref 3.5–5.3)
RBC # BLD: 4.88 M/UL — SIGNIFICANT CHANGE UP (ref 4.2–5.8)
RBC # FLD: 14 % — SIGNIFICANT CHANGE UP (ref 10.3–14.5)
SODIUM SERPL-SCNC: 142 MMOL/L — SIGNIFICANT CHANGE UP (ref 135–145)
WBC # BLD: 11.08 K/UL — HIGH (ref 3.8–10.5)
WBC # FLD AUTO: 11.08 K/UL — HIGH (ref 3.8–10.5)

## 2020-12-09 PROCEDURE — 99233 SBSQ HOSP IP/OBS HIGH 50: CPT | Mod: GC

## 2020-12-09 PROCEDURE — 99232 SBSQ HOSP IP/OBS MODERATE 35: CPT

## 2020-12-09 RX ORDER — INSULIN LISPRO 100/ML
3 VIAL (ML) SUBCUTANEOUS
Refills: 0 | Status: DISCONTINUED | OUTPATIENT
Start: 2020-12-09 | End: 2020-12-10

## 2020-12-09 RX ORDER — LOSARTAN POTASSIUM 100 MG/1
25 TABLET, FILM COATED ORAL DAILY
Refills: 0 | Status: DISCONTINUED | OUTPATIENT
Start: 2020-12-10 | End: 2020-12-10

## 2020-12-09 RX ORDER — LOSARTAN POTASSIUM 100 MG/1
1 TABLET, FILM COATED ORAL
Qty: 0 | Refills: 0 | DISCHARGE
Start: 2020-12-09

## 2020-12-09 RX ORDER — LOSARTAN POTASSIUM 100 MG/1
25 TABLET, FILM COATED ORAL ONCE
Refills: 0 | Status: COMPLETED | OUTPATIENT
Start: 2020-12-09 | End: 2020-12-09

## 2020-12-09 RX ORDER — ASPIRIN/CALCIUM CARB/MAGNESIUM 324 MG
1 TABLET ORAL
Qty: 0 | Refills: 0 | DISCHARGE
Start: 2020-12-09

## 2020-12-09 RX ORDER — AMLODIPINE BESYLATE 2.5 MG/1
1 TABLET ORAL
Qty: 0 | Refills: 0 | DISCHARGE

## 2020-12-09 RX ADMIN — DONEPEZIL HYDROCHLORIDE 10 MILLIGRAM(S): 10 TABLET, FILM COATED ORAL at 21:49

## 2020-12-09 RX ADMIN — Medication 12.5 MILLIGRAM(S): at 05:10

## 2020-12-09 RX ADMIN — Medication 3 UNIT(S): at 16:36

## 2020-12-09 RX ADMIN — Medication 8: at 11:53

## 2020-12-09 RX ADMIN — ENOXAPARIN SODIUM 40 MILLIGRAM(S): 100 INJECTION SUBCUTANEOUS at 11:21

## 2020-12-09 RX ADMIN — Medication 81 MILLIGRAM(S): at 11:20

## 2020-12-09 RX ADMIN — ATORVASTATIN CALCIUM 40 MILLIGRAM(S): 80 TABLET, FILM COATED ORAL at 21:49

## 2020-12-09 RX ADMIN — Medication 12.5 MILLIGRAM(S): at 17:17

## 2020-12-09 RX ADMIN — Medication 6: at 16:37

## 2020-12-09 RX ADMIN — Medication 100 MICROGRAM(S): at 05:10

## 2020-12-09 RX ADMIN — LOSARTAN POTASSIUM 25 MILLIGRAM(S): 100 TABLET, FILM COATED ORAL at 13:09

## 2020-12-09 RX ADMIN — Medication 2: at 08:07

## 2020-12-09 RX ADMIN — HALOPERIDOL DECANOATE 2 MILLIGRAM(S): 100 INJECTION INTRAMUSCULAR at 16:19

## 2020-12-09 RX ADMIN — INSULIN GLARGINE 10 UNIT(S): 100 INJECTION, SOLUTION SUBCUTANEOUS at 21:49

## 2020-12-09 NOTE — PROGRESS NOTE ADULT - SUBJECTIVE AND OBJECTIVE BOX
CAPILLARY BLOOD GLUCOSE      POCT Blood Glucose.: 325 mg/dL (09 Dec 2020 11:40)  POCT Blood Glucose.: 170 mg/dL (09 Dec 2020 07:43)  POCT Blood Glucose.: 192 mg/dL (08 Dec 2020 21:26)  POCT Blood Glucose.: 206 mg/dL (08 Dec 2020 16:49)      Vital Signs Last 24 Hrs  T(C): 36.8 (08 Dec 2020 22:14), Max: 36.8 (08 Dec 2020 22:14)  T(F): 98.3 (08 Dec 2020 22:14), Max: 98.3 (08 Dec 2020 22:14)  HR: 82 (09 Dec 2020 06:05) (78 - 92)  BP: 175/83 (09 Dec 2020 06:05) (160/80 - 184/93)  BP(mean): --  RR: 18 (09 Dec 2020 04:26) (16 - 19)  SpO2: 95% (09 Dec 2020 04:26) (95% - 97%)    General: WN/WD NAD  Respiratory: CTA B/L  CV: RRR, S1S2, no murmurs, rubs or gallops  Abdominal: Soft, NT, ND +BS, Last BM  Extremities: No edema, + peripheral pulses     12-09    142  |  107  |  14  ----------------------------<  185<H>  3.8   |  27  |  1.20    Ca    8.7      09 Dec 2020 07:54  Phos  3.2     12-09  Mg     1.8     12-09    TPro  x   /  Alb  x   /  TBili  0.6  /  DBili  x   /  AST  x   /  ALT  x   /  AlkPhos  x   12-09      atorvastatin 40 milliGRAM(s) Oral at bedtime  dextrose 40% Gel 15 Gram(s) Oral once  dextrose 50% Injectable 25 Gram(s) IV Push once  dextrose 50% Injectable 12.5 Gram(s) IV Push once  dextrose 50% Injectable 25 Gram(s) IV Push once  glucagon  Injectable 1 milliGRAM(s) IntraMuscular once  insulin glargine Injectable (LANTUS) 10 Unit(s) SubCutaneous at bedtime  insulin lispro (ADMELOG) corrective regimen sliding scale   SubCutaneous three times a day before meals  insulin lispro (ADMELOG) corrective regimen sliding scale   SubCutaneous at bedtime  levothyroxine 100 MICROGram(s) Oral daily

## 2020-12-09 NOTE — DIETITIAN INITIAL EVALUATION ADULT. - PROBLEM SELECTOR PLAN 6
-continue home medications  - f/u Thyroid panel  -TSH elevated 4.45 likely 2/2 medication noncompliance.

## 2020-12-09 NOTE — DISCHARGE NOTE PROVIDER - NSCORESITESY/N_GEN_A_CORE_RD
Yes Walking - Indoors allowed/Walking - Outdoors allowed Bathing allowed/Walking - Indoors allowed/Walking - Outdoors allowed

## 2020-12-09 NOTE — PROGRESS NOTE ADULT - PROBLEM SELECTOR PLAN 2
-Cr 1.4 on admission, now down to 0.9-1.2, likely prerenal  -off IV fluids, pt with good PO intake at this time

## 2020-12-09 NOTE — PROGRESS NOTE ADULT - ASSESSMENT
70 male with PMHx of CVA ([id number]), dementia, hypothyroidism, CAD s/p CABG x 4, DM2, HTN admitted for AMS, consulted for sinus tachycardia.     Sinus tachycardia  - Tachycardia could be reactive.  Now resolved.  Maintaining NSR on tele.  No evidence of afib, thus, far  - EKG sinus tachycardia 113 bpm, borderline LAE, 2 fusion beats, QTc 427  - TSH normal  - Continue low dose BB for now    CVA  - MR head negative  - Neuro following  - TTE showed normal RV/LVF, EF 65% with no significant valvular disease  - Continue ASA and statin    HTN urgency, 2/2 medication noncompliance  - BP remains elevated up to systolic 170's.  We were allowing permissive HTN initially, now will control as CVA is unlikely per Neuro  - Would continue low dose BB   - Start Losartan 25 mg daily, first dose now.  Increase as needed for BP control  - No evidence of volume overload    CAD s/p CABG  - Continue ASA, BB, and statin  - Would start ARB  - Would not resume Norvasc at this point     DVT ppx  - Per Primary    - Other cardiovascular workup will depend on clinical course.  - All other workup per primary team.  - Will continue to follow.    Shea Duran DNP, NP-C  Cardiology   Spectra #[phone]        70 male with PMHx of CVA ([phone]), dementia, hypothyroidism, CAD s/p CABG x 4, DM2, HTN admitted for AMS, consulted for sinus tachycardia.     Sinus tachycardia  - Tachycardia could be reactive.  Now resolved.  Maintaining NSR on tele.  No evidence of afib, thus, far  - EKG sinus tachycardia 113 bpm, borderline LAE, 2 fusion beats, QTc 427  - TSH normal  - Continue low dose BB for now    CVA  - MR head negative  - Neuro following  - TTE showed normal RV/LVF, EF 65% with no significant valvular disease  - Continue ASA and statin    HTN urgency, 2/2 medication noncompliance  - BP remains elevated up to systolic 170's.  We were allowing permissive HTN initially, now will control as CVA is unlikely per Neuro  - Would continue low dose BB   - Start Losartan 25 mg daily, first dose now.  Increase as needed for BP control. monitor cr  - No evidence of volume overload    CAD s/p CABG  - Continue ASA, BB, and statin  - Would start ARB        DVT ppx  - Per Primary    - Other cardiovascular workup will depend on clinical course.  - All other workup per primary team.  - Will continue to follow.    Shea Duran DNP, NP-C  Cardiology   Spectra #[phone]

## 2020-12-09 NOTE — PROGRESS NOTE ADULT - PROBLEM SELECTOR PLAN 4
- Chronic, on amlodipine at home. Medication noncompliance  - On admission, /66 on admission, likely hypertensive urgency due to negative imaging and CVA workup  - Home medication: amlodipine 10mg QD discontinued and started on metoprolol per cardio recs.  - will start Losartan 25mg QD per cardio recs, neuro agreeable to additional antiHTN  - monitor serial BPs  - Cardio ([place]) following, recs appreciated

## 2020-12-09 NOTE — DIETITIAN INITIAL EVALUATION ADULT. - PROBLEM SELECTOR PLAN 4
On admission, EKG Sinus Tachy 113. Likely 2/2 agitation vs infection   - WBC 11.65, Neutrophil 9.6, however no clear source. UA with no leuks or WBC or nitrites. f/u blood cultures, urine culture  - patient agitated during interview could contribute to tachycardia.   - remote tele monitoring  - Cardiology, [facility] consulted; f/u recs

## 2020-12-09 NOTE — PROGRESS NOTE ADULT - PROBLEM SELECTOR PLAN 6
Chronic  - c/w home atorvastatin 40mg QD,   - c/w 81mg QD  - TTE wnl as above  - Losartan 25mg daily started today

## 2020-12-09 NOTE — DISCHARGE NOTE PROVIDER - CARE PROVIDER_API CALL
ENMANUEL SMITH  [id number] [address] [id number]  Phone: [phone]  Fax: ()-  Follow Up Time:    ENMANUEL SMITH  [id number]  [address] [place] [address]  Phone: ()-  Fax: ()-  Follow Up Time:     Prasad Alonzo  INTERNAL MEDICINE  [address], [place] [id number]  Phone: [phone]  Fax: [phone]  Follow Up Time:     Perlman, Craig D  MEDICINE  [address], [address]  [place] [address]  Phone: [phone]  Fax: [phone]  Follow Up Time:     Blayne Nieves  NEUROLOGY  [address] [place] [address]  Phone: [phone]  Fax: [phone]  Follow Up Time:

## 2020-12-09 NOTE — PROGRESS NOTE ADULT - SUBJECTIVE AND OBJECTIVE BOX
Neurology Follow up note    SURY [id number]    HPI:  The patient is a 70 male with PMHx of CVA ([id number]), dementia, hypothyroidism, CAD s/p CABGx4, DM2, HTN who presented to the ED for altered mental status. Son, Jose E Alston at bedside provided history. As per son, patient was dizzy in the morning, was sat down and he blankly stared for five minute duration. When he came back to, he was confused and agitated. Family called EMS, and pt BIBA. Patient repeating same words over and over again, stating he is fine and getting agitated that he is the hospital. As per son, pt continues to make up stories during conversation which started today. He notes father said that he had to sign over a business, when in fact, the patient does not have any business. He also was telling the son about an argument that they had which never occurred. Son states father was A&Ox3 yesterday and in usual state of health. Of note, patient with similar presentation in past, during last CVA. Son unsure of when last CVA was, but states father was hospitalized at [facility]. He had no residual weakness after previous stroke. As per son, patient was in usual state of health yesterday but has been noncompliant with meds for the past 2 weeks. Son, Jose E, does not live with patient. Told to speak to step brother, Hector.   Spoke to son, Hector Alston, over telephone at [phone]. He states father was inhorent in the morning and had the episode of blankly staring at the wall. When he began to respond again, he was slightly agitated and he could not tell who the son was. He states this was similar to past stroke in 2018/2019. Said patient is noncompliant with medication use, and only uses it when he feels like taking medications. Further information can be obtained from [place] ([place] speaking only) at [phone].   In the ED:   Vital Signs: T(F): 97.6, HR: 107 -->86, BP: 203/66 -->139/78, RR: 18, SpO2: 99% on RA  Labs Significant for: WBC 11.65, BUN/Cr - 28/1.40, lactate 2.1, TSH 4.45, Glucose 291, CKMB 3.9, Pro-, UA: small blood, glucose, and occasional bacteria  EKG: Sinus Tach 113bpm, with PVCs; Possible left atrial enlargement, QTc 427ms  CT Head: Mild chronic microvascular changes without evidence of an acute transcortical infarction or hemorrhage. Consider MRI as clinically warranted.  CXR: clear lungs.   In ED pt received, 2L NaCl IV bolus, Haldol 2.5 mg x1, ativan 1mg x1  (06 Dec 2020 15:15)      Interval History -mental status better today    Patient is seen, chart was reviewed and case was discussed with the treatment team.  Pt is not in any distress.   Lying on bed comfortably.   No events reported overnight.     Vital Signs Last 24 Hrs  T(C): 36.8 (09 Dec 2020 13:21), Max: 36.8 (08 Dec 2020 22:14)  T(F): 98.3 (09 Dec 2020 13:21), Max: 98.3 (08 Dec 2020 22:14)  HR: 99 (09 Dec 2020 13:21) (78 - 99)  BP: 174/86 (09 Dec 2020 13:21) (167/78 - 184/93)  BP(mean): --  RR: 18 (09 Dec 2020 13:21) (18 - 19)  SpO2: 95% (09 Dec 2020 13:21) (95% - 97%)        REVIEW OF SYSTEMS:    Constitutional: No fever,   Eyes: No eye pain, visual disturbances, or discharge  ENT:  No difficulty hearing, tinnitus, vertigo; No sinus or throat pain  Neck: No pain or stiffness  Respiratory: No wheezing, chills or hemoptysis  Cardiovascular: No chest pain, palpitations,   Gastrointestinal: No abdominal or epigastric pain. No nausea, vomiting or hematemesis;  Genitourinary: No dysuria, frequency, hematuria or incontinence  Neurological: No headaches,  Psychiatric: No d, mood swings or difficulty sleeping  Musculoskeletal: No joint pain or swelling; No muscle, back or extremity pain  Skin: No itching, burning, rashes or lesions   Lymph Nodes: No enlarged glands  Endocrine: No heat or cold intolerance; No hair loss,  Allergy and Immunologic: No hives or eczema    On Neurological Examination:    Mental Status - Pt is alert, awake, following simple command  not agitated      Speech - Pt has dysarthria.    Cranial Nerves - Pupils 3 mm equal and reactive to light, extraocular eye movements intact. Pt has no visual field deficit.  Pt has no facial asymmetry. Facial sensation is intact.Tongue - is in midline.    Muscle tone - is normal     Motor Exam - 4+/5 all over, No drift. No shaking or tremors.    Sensory Exam - . Pt withdraws all extremities equally on stimulation. No asymmetry seen. No complaints of tingling, numbness.      coordination:    Finger to nose: normal      Deep tendon Reflexes - 2 plus all over.        .    Neck Supple -  Yes.     MEDICATIONS  (STANDING):  aspirin enteric coated 81 milliGRAM(s) Oral daily  atorvastatin 40 milliGRAM(s) Oral at bedtime  dextrose 40% Gel 15 Gram(s) Oral once  dextrose 5%. 1000 milliLiter(s) (50 mL/Hr) IV Continuous <Continuous>  dextrose 5%. 1000 milliLiter(s) (100 mL/Hr) IV Continuous <Continuous>  dextrose 50% Injectable 25 Gram(s) IV Push once  dextrose 50% Injectable 12.5 Gram(s) IV Push once  dextrose 50% Injectable 25 Gram(s) IV Push once  donepezil 10 milliGRAM(s) Oral at bedtime  enoxaparin Injectable 40 milliGRAM(s) SubCutaneous daily  glucagon  Injectable 1 milliGRAM(s) IntraMuscular once  influenza   Vaccine 0.5 milliLiter(s) IntraMuscular once  insulin glargine Injectable (LANTUS) 10 Unit(s) SubCutaneous at bedtime  insulin lispro (ADMELOG) corrective regimen sliding scale   SubCutaneous three times a day before meals  insulin lispro (ADMELOG) corrective regimen sliding scale   SubCutaneous at bedtime  insulin lispro Injectable (ADMELOG) 3 Unit(s) SubCutaneous three times a day before meals  levothyroxine 100 MICROGram(s) Oral daily  metoprolol tartrate 12.5 milliGRAM(s) Oral two times a day    MEDICATIONS  (PRN):  haloperidol     Tablet 2 milliGRAM(s) Oral every 8 hours PRN Severe agitation  >      Allergies    No Known Allergies    Intolerances      12-09    142  |  107  |  14  ----------------------------<  185<H>  3.8   |  27  |  1.20    Ca    8.7      09 Dec 2020 07:54  Phos  3.2     12-09  Mg     1.8     12-09      Total Cholesterol, Serum 160  LDL --  Triglycerides 90    Vitamin B12 Vitamin B12, Serum: 724 pg/mL (12-06 @ 23:22)      RADIOLOGY    < from: MR Head No Cont (12.07.20 @ 11:40) >    EXAM:  MR BRAIN                            PROCEDURE DATE:  12/07/2020          INTERPRETATION:  CLINICAL STATEMENT: Pain.    TECHNIQUE: MRI of the brain was performed without gadolinium.    COMPARISON: CT head 12/6/2020.    FINDINGS:  There is moderate diffuse parenchymal volume loss. There are T2 prolongation signal abnormalities in the periventricular subcortical white matter likely related to mild chronic microvascular ischemic changes.    Chronic hemosiderin deposition left occipital lobe noted. Encephalomalacia/gliosis left temporal lobe with hemosiderin deposition    There is no acute parenchymal hemorrhage, parenchymal mass, mass effect or midline shift. There is no extra-axial fluid collection.  There is no hydrocephalus.  There isno acute infarct.    IMPRESSION:  No acute intracranial hemorrhage or acute infarct.            FREDDIE BHAT MD; Attending Radiologist  This document has been electronically signed. Dec  7 2020 11:57AM    ASSESSMENT AND PLAN:      seen for ams ; cva less likely as brain mri is unremarkable  hx of cva/dementia    EEG REPORTED AS NO SEIZURE ACTIVITY  continue asa/statin  Physical therapy evaluation.  OOB to chair/ambulation with assistance only.  Pain is accessed and addressed.  Would continue to follow.

## 2020-12-09 NOTE — PROGRESS NOTE ADULT - PROBLEM SELECTOR PLAN 7
-chronic, on home medications, levothyroxine 100mcg daily  -continue inpatient  -TSH elevated 4.45 on admission likely 2/2 medication noncompliance, repeat TSH in range 3.74  -T4 in range, will continue current dose  -Endo, Perlman, following

## 2020-12-09 NOTE — CHART NOTE - NSCHARTNOTEFT_GEN_A_CORE
Called by RN for /93, rechecked manually 180/82. Rest of VSS. Pt asymptomatic. Pt given AM metoprolol dose, BP rechecked 30 mins later was 175/83. Pt admitted for AMS r/o CVA, likely not CVA from workup however per cardio note allowing permissive HTN for 48 hrs. Primary team to assess need for additional BP medication as permissive HTN window finishing today. Will continue to monitor. RN to call with any changes.     Vital Signs Last 24 Hrs  T(C): 36.8 (08 Dec 2020 22:14), Max: 36.8 (08 Dec 2020 22:14)  T(F): 98.3 (08 Dec 2020 22:14), Max: 98.3 (08 Dec 2020 22:14)  HR: 82 (09 Dec 2020 06:05) (78 - 92)  BP: 175/83 (09 Dec 2020 06:05) (160/80 - 184/93)  BP(mean): --  RR: 18 (09 Dec 2020 04:26) (16 - 19)  SpO2: 95% (09 Dec 2020 04:26) (95% - 97%)

## 2020-12-09 NOTE — DISCHARGE NOTE PROVIDER - NSDCFUADDINST_GEN_ALL_CORE_FT
Please make an appointment and follow up with your PCP within 1 week of discharge    Please follow up with neurology, endocrinology and cardiology upon discharge. Physician Information to be found in discharge summary    Patients are responsible for making their own appointments and follow ups.    Please remain compliant with all medications and follow up appointments.

## 2020-12-09 NOTE — DISCHARGE NOTE PROVIDER - HOSPITAL COURSE
FROM ADMISSION H+P:   HPI:  The patient is a 70 male with PMHx of CVA ([id number]), dementia, hypothyroidism, CAD s/p CABGx4, DM2, HTN who presented to the ED for altered mental status. Son, Jose E Alston at bedside provided history. As per son, patient was dizzy in the morning, was sat down and he blankly stared for five minute duration. When he came back to, he was confused and agitated. Family called EMS, and pt BIBA. Patient repeating same words over and over again, stating he is fine and getting agitated that he is the hospital. As per son, pt continues to make up stories during conversation which started today. He notes father said that he had to sign over a business, when in fact, the patient does not have any business. He also was telling the son about an argument that they had which never occurred. Son states father was A&Ox3 yesterday and in usual state of health. Of note, patient with similar presentation in past, during last CVA. Son unsure of when last CVA was, but states father was hospitalized at [facility]. He had no residual weakness after previous stroke. As per son, patient was in usual state of health yesterday but has been noncompliant with meds for the past 2 weeks. Son, Jose E, does not live with patient. Told to speak to step brother, Hector.   Spoke to son, Hector Alston, over telephone at [phone]. He states father was inhorent in the morning and had the episode of blankly staring at the wall. When he began to respond again, he was slightly agitated and he could not tell who the son was. He states this was similar to past stroke in 2018/2019. Said patient is noncompliant with medication use, and only uses it when he feels like taking medications. Further information can be obtained from [facility] (Georgian speaking only) at [phone].   In the ED:   Vital Signs: T(F): 97.6, HR: 107 -->86, BP: 203/66 -->139/78, RR: 18, SpO2: 99% on RA  Labs Significant for: WBC 11.65, BUN/Cr - 28/1.40, lactate 2.1, TSH 4.45, Glucose 291, CKMB 3.9, Pro-, UA: small blood, glucose, and occasional bacteria  EKG: Sinus Tach 113bpm, with PVCs; Possible left atrial enlargement, QTc 427ms  CT Head: Mild chronic microvascular changes without evidence of an acute transcortical infarction or hemorrhage. Consider MRI as clinically warranted.  CXR: clear lungs.   In ED pt received, 2L NaCl IV bolus, Haldol 2.5 mg x1, ativan 1mg x1  (06 Dec 2020 15:15)    ---  HOSPITAL COURSE:  Patient admitted for AMS. Thought to be caused by CVA vs hypertensive urgency/emergency. CVA workup negative. CT head showed chronic microvascular changes with MRI showing no acute infarct. Carotid doppler was negative for significant stenosis and TTE done showed normal LVSF with EF 65%. Neurology was consulted and followed the patient throughout hospital course. EEG was also ordered per neurology and showed _______. Pt found to have SBP into 200s with tachycardia on admission. Cardio was consulted and changed home medication Norvasc to low dose BB Lopressor 12.5mg BID. Permissive HTN allowed for 48 hours however SBPs remained in 180s and Losartan 25mg added to regimen. Blood glucose was in 300s on admission, noncompliant with home medication regimen (metformin, glimepiride and insulin). Endocrinology consulted for BG management and pt was treated with Lantus 10 units qhs and mod dose ISS. Pt was medically optimized for discharge to _______.    PT saw the patient and recommended rehab/[facility]    ---  CONSULTANTS:     Neuro: Dr. Nieves  Cardio: [facility]  Endo: Dr. Perlman  Palliative: Dr. Sanchez    ---  TIME SPENT:  The total amount of time spent reviewing the hospital notes, laboratory values, imaging findings, assessing/counseling the patient, discussing with consultant physicians, social work, nursing staff was -- minutes     FROM ADMISSION H+P:   HPI:  The patient is a 70 male with PMHx of CVA ([phone]), dementia, hypothyroidism, CAD s/p CABGx4, DM2, HTN who presented to the ED for altered mental status. Son, Jose E Alston at bedside provided history. As per son, patient was dizzy in the morning, was sat down and he blankly stared for five minute duration. When he came back to, he was confused and agitated. Family called EMS, and pt BIBA. Patient repeating same words over and over again, stating he is fine and getting agitated that he is the hospital. As per son, pt continues to make up stories during conversation which started today. He notes father said that he had to sign over a business, when in fact, the patient does not have any business. He also was telling the son about an argument that they had which never occurred. Son states father was A&Ox3 yesterday and in usual state of health. Of note, patient with similar presentation in past, during last CVA. Son unsure of when last CVA was, but states father was hospitalized at [facility]. He had no residual weakness after previous stroke. As per son, patient was in usual state of health yesterday but has been noncompliant with meds for the past 2 weeks. Son, Jose E, does not live with patient. Told to speak to step brother, Hector.   Spoke to son, Hector Alston, over telephone at [phone]. He states father was inhorent in the morning and had the episode of blankly staring at the wall. When he began to respond again, he was slightly agitated and he could not tell who the son was. He states this was similar to past stroke in 2018/2019. Said patient is noncompliant with medication use, and only uses it when he feels like taking medications. Further information can be obtained from [facility] (Belarusian speaking only) at [phone].   In the ED:   Vital Signs: T(F): 97.6, HR: 107 -->86, BP: 203/66 -->139/78, RR: 18, SpO2: 99% on RA  Labs Significant for: WBC 11.65, BUN/Cr - 28/1.40, lactate 2.1, TSH 4.45, Glucose 291, CKMB 3.9, Pro-, UA: small blood, glucose, and occasional bacteria  EKG: Sinus Tach 113bpm, with PVCs; Possible left atrial enlargement, QTc 427ms  CT Head: Mild chronic microvascular changes without evidence of an acute transcortical infarction or hemorrhage. Consider MRI as clinically warranted.  CXR: clear lungs.   In ED pt received, 2L NaCl IV bolus, Haldol 2.5 mg x1, ativan 1mg x1  (06 Dec 2020 15:15)    ---  HOSPITAL COURSE:  Patient admitted for AMS. Thought to be caused by CVA vs hypertensive urgency/emergency. CVA workup negative. CT head showed chronic microvascular changes with MRI showing no acute infarct. Carotid doppler was negative for significant stenosis and TTE done showed normal LVSF with EF 65%. Neurology was consulted and followed the patient throughout hospital course. EEG was also ordered per neurology and showed presence of mild bilateral background slowing. This is consistent with bilateral or multifocal cerebral dysfunction of numerous etiologies and are nonspecific. Pt found to have SBP into 200s with tachycardia on admission. Cardio was consulted and changed home medication Norvasc to low dose BB Lopressor 12.5mg BID. Permissive HTN allowed for 48 hours however SBPs remained in 180s and Losartan 25mg added to regimen. Blood glucose was in 300s on admission, noncompliant with home medication regimen (metformin, glimepiride and insulin). Endocrinology consulted for BG management and pt was treated with Lantus 10 units qhs and mod dose ISS. Pt was medically optimized for discharge to [facility]. SW conferred with pts daughter Eva regarding options of [facility] near the patients home.    PT saw the patient and recommended rehab/[facility]    ---  CONSULTANTS:     Neuro: Dr. Nieves  Cardio: [facility]  Endo: Dr. Perlman  Palliative: Dr. Sanchez    ---  TIME SPENT:  The total amount of time spent reviewing the hospital notes, laboratory values, imaging findings, assessing/counseling the patient, discussing with consultant physicians, social work, nursing staff was -- minutes     FROM ADMISSION H+P:   HPI:  The patient is a 70 male with PMHx of CVA ([id number]), dementia, hypothyroidism, CAD s/p CABGx4, DM2, HTN who presented to the ED for altered mental status. Son, Jose E Alston at bedside provided history. As per son, patient was dizzy in the morning, was sat down and he blankly stared for five minute duration. When he came back to, he was confused and agitated. Family called EMS, and pt BIBA. Patient repeating same words over and over again, stating he is fine and getting agitated that he is the hospital. As per son, pt continues to make up stories during conversation which started today. He notes father said that he had to sign over a business, when in fact, the patient does not have any business. He also was telling the son about an argument that they had which never occurred. Son states father was A&Ox3 yesterday and in usual state of health. Of note, patient with similar presentation in past, during last CVA. Son unsure of when last CVA was, but states father was hospitalized at [facility]. He had no residual weakness after previous stroke. As per son, patient was in usual state of health yesterday but has been noncompliant with meds for the past 2 weeks. Son, Jose E, does not live with patient. Told to speak to step brother, Hector.   Spoke to son, Hector Alston, over telephone at [phone]. He states father was inhorent in the morning and had the episode of blankly staring at the wall. When he began to respond again, he was slightly agitated and he could not tell who the son was. He states this was similar to past stroke in 2018/2019. Said patient is noncompliant with medication use, and only uses it when he feels like taking medications. Further information can be obtained from [facility] (Bengali speaking only) at [phone].   In the ED:   Vital Signs: T(F): 97.6, HR: 107 -->86, BP: 203/66 -->139/78, RR: 18, SpO2: 99% on RA  Labs Significant for: WBC 11.65, BUN/Cr - 28/1.40, lactate 2.1, TSH 4.45, Glucose 291, CKMB 3.9, Pro-, UA: small blood, glucose, and occasional bacteria  EKG: Sinus Tach 113bpm, with PVCs; Possible left atrial enlargement, QTc 427ms  CT Head: Mild chronic microvascular changes without evidence of an acute transcortical infarction or hemorrhage. Consider MRI as clinically warranted.  CXR: clear lungs.   In ED pt received, 2L NaCl IV bolus, Haldol 2.5 mg x1, ativan 1mg x1  (06 Dec 2020 15:15)    ---  HOSPITAL COURSE:  Patient admitted for AMS. Thought to be caused by CVA vs hypertensive urgency/emergency. CVA workup negative. CT head showed chronic microvascular changes with MRI showing no acute infarct. Carotid doppler was negative for significant stenosis and TTE done showed normal LVSF with EF 65%. Neurology was consulted and followed the patient throughout hospital course. EEG was also ordered per neurology and showed presence of mild bilateral background slowing. This is consistent with bilateral or multifocal cerebral dysfunction of numerous etiologies and are nonspecific. Pt found to have SBP into 200s with tachycardia on admission. Cardio was consulted and changed home medication Norvasc to low dose BB Lopressor 12.5mg BID. Permissive HTN allowed for 48 hours however SBPs remained in 180s and Losartan 25mg added to regimen with increase to 50mg daily due to elevated SBPs into 170s. Blood glucose was in 300s on admission, noncompliant with home medication regimen (metformin, glimepiride and insulin). Endocrinology consulted for BG management and pt was treated with Lantus 10 units qhs and mod dose ISS. Pt was medically optimized for discharge to _____. SW conferred with pts daughter Eva regarding options of [facility] near the patients home initially however pt was able to ambulate >200 ft with PT so pending dispo currently    PT saw the patient and recommended rehab/[facility] initially however patient was able to ambulate >200 feet so will likely be discharge to home with assist. Dispo planning in progress. SW to still send out [facility] applications however will likely be denied.     ---  CONSULTANTS:     Neuro: Dr. Nieves  Cardio: [facility]  Endo: Dr. Perlman  Palliative: Dr. Sanchez    ---  TIME SPENT:  The total amount of time spent reviewing the hospital notes, laboratory values, imaging findings, assessing/counseling the patient, discussing with consultant physicians, social work, nursing staff was -- minutes     FROM ADMISSION H+P:   HPI:  The patient is a 70 male with PMHx of CVA ([phone]), dementia, hypothyroidism, CAD s/p CABGx4, DM2, HTN who presented to the ED for altered mental status. Son, Jose E Alston at bedside provided history. As per son, patient was dizzy in the morning, was sat down and he blankly stared for five minute duration. When he came back to, he was confused and agitated. Family called EMS, and pt BIBA. Patient repeating same words over and over again, stating he is fine and getting agitated that he is the hospital. As per son, pt continues to make up stories during conversation which started today. He notes father said that he had to sign over a business, when in fact, the patient does not have any business. He also was telling the son about an argument that they had which never occurred. Son states father was A&Ox3 yesterday and in usual state of health. Of note, patient with similar presentation in past, during last CVA. Son unsure of when last CVA was, but states father was hospitalized at [facility]. He had no residual weakness after previous stroke. As per son, patient was in usual state of health yesterday but has been noncompliant with meds for the past 2 weeks. Son, Jose E, does not live with patient. Told to speak to step brother, Hector.   Spoke to son, Hector Alston, over telephone at [phone]. He states father was inhorent in the morning and had the episode of blankly staring at the wall. When he began to respond again, he was slightly agitated and he could not tell who the son was. He states this was similar to past stroke in 2018/2019. Said patient is noncompliant with medication use, and only uses it when he feels like taking medications. Further information can be obtained from [facility] (Persian speaking only) at [phone].   In the ED:   Vital Signs: T(F): 97.6, HR: 107 -->86, BP: 203/66 -->139/78, RR: 18, SpO2: 99% on RA  Labs Significant for: WBC 11.65, BUN/Cr - 28/1.40, lactate 2.1, TSH 4.45, Glucose 291, CKMB 3.9, Pro-, UA: small blood, glucose, and occasional bacteria  EKG: Sinus Tach 113bpm, with PVCs; Possible left atrial enlargement, QTc 427ms  CT Head: Mild chronic microvascular changes without evidence of an acute transcortical infarction or hemorrhage. Consider MRI as clinically warranted.  CXR: clear lungs.   In ED pt received, 2L NaCl IV bolus, Haldol 2.5 mg x1, ativan 1mg x1  (06 Dec 2020 15:15)    ---  HOSPITAL COURSE:  Patient admitted for AMS. Thought to be caused by CVA vs hypertensive urgency/emergency. CVA workup negative. CT head showed chronic microvascular changes with MRI showing no acute infarct. Carotid doppler was negative for significant stenosis and TTE done showed normal LVSF with EF 65%. Neurology was consulted and followed the patient throughout hospital course. EEG was also ordered per neurology and showed presence of mild bilateral background slowing. This is consistent with bilateral or multifocal cerebral dysfunction of numerous etiologies and are nonspecific. Pt found to have SBP into 200s with tachycardia on admission. Cardio was consulted and changed home medication Norvasc to low dose BB Lopressor 12.5mg BID. Permissive HTN allowed for 48 hours however SBPs remained in 180s and Losartan 25mg added to regimen with increase to 50mg daily due to elevated SBPs into 170s. Blood glucose was in 300s on admission, noncompliant with home medication regimen (metformin, glimepiride and insulin). Endocrinology consulted for BG management and pt was treated with Lantus 10 units qhs and mod dose ISS. Pt was medically optimized for discharge to home. Pt was initially planned to go to [facility] however was able to ambulate >200 ft with PT so pt to go home instead as [facility] not needed    PT saw the patient and recommended rehab/[facility] initially however patient was able to ambulate >200 feet so will likely be discharge to home with assist.    ---  CONSULTANTS:     Neuro: Dr. Nieves  Cardio: [facility]  Endo: Dr. Perlman  Palliative: Dr. Sanchez    ---  TIME SPENT:  The total amount of time spent reviewing the hospital notes, laboratory values, imaging findings, assessing/counseling the patient, discussing with consultant physicians, social work, nursing staff was -- minutes     FROM ADMISSION H+P:   HPI:  The patient is a 70 male with PMHx of CVA ([phone]), dementia, hypothyroidism, CAD s/p CABGx4, DM2, HTN who presented to the ED for altered mental status. Son, Jose E Alston at bedside provided history. As per son, patient was dizzy in the morning, was sat down and he blankly stared for five minute duration. When he came back to, he was confused and agitated. Family called EMS, and pt BIBA. Patient repeating same words over and over again, stating he is fine and getting agitated that he is the hospital. As per son, pt continues to make up stories during conversation which started today. He notes father said that he had to sign over a business, when in fact, the patient does not have any business. He also was telling the son about an argument that they had which never occurred. Son states father was A&Ox3 yesterday and in usual state of health. Of note, patient with similar presentation in past, during last CVA. Son unsure of when last CVA was, but states father was hospitalized at [facility]. He had no residual weakness after previous stroke. As per son, patient was in usual state of health yesterday but has been noncompliant with meds for the past 2 weeks. Son, Jose E, does not live with patient. Told to speak to step brother, Hector.   Spoke to son, Hector Alston, over telephone at [phone]. He states father was inhorent in the morning and had the episode of blankly staring at the wall. When he began to respond again, he was slightly agitated and he could not tell who the son was. He states this was similar to past stroke in 2018/2019. Said patient is noncompliant with medication use, and only uses it when he feels like taking medications. Further information can be obtained from [facility] (Tajik speaking only) at [phone].   In the ED:   Vital Signs: T(F): 97.6, HR: 107 -->86, BP: 203/66 -->139/78, RR: 18, SpO2: 99% on RA  Labs Significant for: WBC 11.65, BUN/Cr - 28/1.40, lactate 2.1, TSH 4.45, Glucose 291, CKMB 3.9, Pro-, UA: small blood, glucose, and occasional bacteria  EKG: Sinus Tach 113bpm, with PVCs; Possible left atrial enlargement, QTc 427ms  CT Head: Mild chronic microvascular changes without evidence of an acute transcortical infarction or hemorrhage. Consider MRI as clinically warranted.  CXR: clear lungs.   In ED pt received, 2L NaCl IV bolus, Haldol 2.5 mg x1, ativan 1mg x1  (06 Dec 2020 15:15)    ---  HOSPITAL COURSE:  Patient admitted for AMS. Thought to be caused by CVA vs hypertensive urgency/emergency. CVA workup negative. CT head showed chronic microvascular changes with MRI showing no acute infarct. Carotid doppler was negative for significant stenosis and TTE done showed normal LVSF with EF 65%. Neurology was consulted and followed the patient throughout hospital course. EEG was also ordered per neurology and showed presence of mild bilateral background slowing. This is consistent with bilateral or multifocal cerebral dysfunction of numerous etiologies and are nonspecific. Pt found to have SBP into 200s with tachycardia on admission. Cardio was consulted and changed home medication Norvasc to low dose BB Lopressor 12.5mg BID. Permissive HTN allowed for 48 hours however SBPs remained in 180s and Losartan 25mg added to regimen with increase to 50mg daily due to elevated SBPs into 170s. Blood glucose was in 300s on admission, noncompliant with home medication regimen (metformin, glimepiride and insulin). Endocrinology consulted for BG management and pt was treated with Lantus 10 units qhs and mod dose ISS. Pt was medically optimized for discharge to home. Pt was initially planned to go to [facility] however was able to ambulate >200 ft with PT so pt to go home instead as [facility] not needed. No skilled home PT or assistance needed.    PT saw the patient and recommended rehab/[facility] initially however patient was able to ambulate >200 feet so will likely be discharge to home.    ---  CONSULTANTS:     Neuro: Dr. Nieves  Cardio: [facility]  Endo: Dr. Perlman  Palliative: Dr. Sanchez    ---  TIME SPENT:  The total amount of time spent reviewing the hospital notes, laboratory values, imaging findings, assessing/counseling the patient, discussing with consultant physicians, social work, nursing staff was -- minutes     FROM ADMISSION H+P:   HPI:  The patient is a 70 male with PMHx of CVA ([phone]), dementia, hypothyroidism, CAD s/p CABGx4, DM2, HTN who presented to the ED for altered mental status. Son, Jose E Alston at bedside provided history. As per son, patient was dizzy in the morning, was sat down and he blankly stared for five minute duration. When he came back to, he was confused and agitated. Family called EMS, and pt BIBA. Patient repeating same words over and over again, stating he is fine and getting agitated that he is the hospital. As per son, pt continues to make up stories during conversation which started today. He notes father said that he had to sign over a business, when in fact, the patient does not have any business. He also was telling the son about an argument that they had which never occurred. Son states father was A&Ox3 yesterday and in usual state of health. Of note, patient with similar presentation in past, during last CVA. Son unsure of when last CVA was, but states father was hospitalized at [facility]. He had no residual weakness after previous stroke. As per son, patient was in usual state of health yesterday but has been noncompliant with meds for the past 2 weeks. Son, Jose E, does not live with patient. Told to speak to step brother, Hector.   Spoke to son, Hector Alston, over telephone at [phone]. He states father was inhorent in the morning and had the episode of blankly staring at the wall. When he began to respond again, he was slightly agitated and he could not tell who the son was. He states this was similar to past stroke in 2018/2019. Said patient is noncompliant with medication use, and only uses it when he feels like taking medications. Further information can be obtained from [facility] (Nepali speaking only) at [phone].     ---  HOSPITAL COURSE:    Patient admitted for AMS. Thought to be caused by CVA vs hypertensive urgency/emergency. CVA workup negative. CT head showed chronic microvascular changes with MRI showing no acute infarct. Carotid doppler was negative for significant stenosis and TTE done showed normal LVSF with EF 65%. Neurology was consulted and followed the patient throughout hospital course. EEG was also ordered per neurology and showed presence of mild bilateral background slowing. This is consistent with bilateral or multifocal cerebral dysfunction of numerous etiologies and are nonspecific. Pt found to have SBP into 200s with tachycardia on admission. Cardio was consulted and changed home medication Norvasc to low dose BB Lopressor 12.5mg BID. Permissive HTN allowed for 48 hours however SBPs remained in 180s and Losartan 25mg added to regimen with increase to 50mg daily due to elevated SBPs into 170s. Blood glucose was in 300s on admission, noncompliant with home medication regimen (metformin, glimepiride and insulin). Endocrinology consulted for BG management and pt was treated with Lantus 10 units qhs and mod dose ISS. Pt was medically optimized for discharge to home. Pt was initially planned to go to [facility] however was able to ambulate >200 ft with PT so pt to go home instead as [facility] not needed. No skilled home PT or assistance needed.    PT saw the patient and recommended rehab/[facility] initially however patient was able to ambulate >200 feet so will likely be discharge to home.    ---  CONSULTANTS:   Neuro: Dr. Nieves  Cardio: [facility]  Endo: Dr. Perlman  Palliative: Dr. Sanchez    ---  TIME SPENT:  I, the attending physician, was physically present for the key portions of the evaluation and management (E/M) service provided.  I agree with the above findings which I have reviewed and edited where appropriate.  The total amount of time spent preparing the discharge and follow up plan was 49 minutes.

## 2020-12-09 NOTE — DISCHARGE NOTE PROVIDER - NSDCMRMEDTOKEN_GEN_ALL_CORE_FT
amLODIPine 10 mg oral tablet: 1 tab(s) orally once a day  atorvastatin 40 mg oral tablet: 1 tab(s) orally once a day  donepezil 10 mg oral tablet: 1 tab(s) orally once a day (at bedtime)  glimepiride 2 mg oral tablet: 1 tab(s) orally once a day  levothyroxine 100 mcg (0.1 mg) oral tablet: 1 tab(s) orally once a day  metFORMIN 850 mg oral tablet: 1 tab(s) orally 3 times a day  NovoLOG FlexPen 100 units/mL injectable solution: dose(s) injectable once a day   aspirin 81 mg oral delayed release tablet: 1 tab(s) orally once a day  atorvastatin 40 mg oral tablet: 1 tab(s) orally once a day  donepezil 10 mg oral tablet: 1 tab(s) orally once a day (at bedtime)  glimepiride 2 mg oral tablet: 1 tab(s) orally once a day  levothyroxine 100 mcg (0.1 mg) oral tablet: 1 tab(s) orally once a day  losartan 25 mg oral tablet: 1 tab(s) orally once a day  metFORMIN 850 mg oral tablet: 1 tab(s) orally 3 times a day  NovoLOG FlexPen 100 units/mL injectable solution: dose(s) injectable once a day   amLODIPine 10 mg oral tablet: 1 tab(s) orally once a day  aspirin 81 mg oral delayed release tablet: 1 tab(s) orally once a day  atorvastatin 40 mg oral tablet: 1 tab(s) orally once a day  donepezil 10 mg oral tablet: 1 tab(s) orally once a day (at bedtime)  glimepiride 2 mg oral tablet: 1 tab(s) orally once a day  levothyroxine 100 mcg (0.1 mg) oral tablet: 1 tab(s) orally once a day  metFORMIN 850 mg oral tablet: 1 tab(s) orally 3 times a day  NovoLOG FlexPen 100 units/mL injectable solution: dose(s) injectable once a day   amLODIPine 10 mg oral tablet: 1 tab(s) orally once a day  aspirin 81 mg oral delayed release tablet: 1 tab(s) orally once a day  atorvastatin 40 mg oral tablet: 1 tab(s) orally once a day  donepezil 10 mg oral tablet: 1 tab(s) orally once a day (at bedtime)  glimepiride 2 mg oral tablet: 1 tab(s) orally once a day  levothyroxine 100 mcg (0.1 mg) oral tablet: 1 tab(s) orally once a day  losartan 50 mg oral tablet: 1 tab(s) orally once a day   metFORMIN 850 mg oral tablet: 1 tab(s) orally 3 times a day  metoprolol tartrate 25 mg oral tablet: 0.5 tab(s) orally 2 times a day   NovoLOG FlexPen 100 units/mL injectable solution: dose(s) injectable once a day

## 2020-12-09 NOTE — DIETITIAN INITIAL EVALUATION ADULT. - DIET TYPE
consistent carbohydrate (no snacks)/dysphagia 2, mechanical soft, nectar consistency fld/supplement (specify)/DASH/TLC (sodium and cholesterol restricted diet)/glucerna BID

## 2020-12-09 NOTE — DIETITIAN INITIAL EVALUATION ADULT. - OTHER INFO
70 year old male dx change in mental status PMH CAD dementia DM CVA HTN   speech recommends dysphagia 2 mechanical soft nectar thick with no straw and 1 to 1 assist  patient seen sleeping in bed turned to side.   on metformin glimepiride and insulin PTA   admit wt 160# bed scale 142.4#? patient with dementia unable to obtain history will follow weights

## 2020-12-09 NOTE — PROGRESS NOTE ADULT - ASSESSMENT
70 male with PMHx of CVA ([id number]), dementia, CAD s/p CABGx4, DM2, HTN, BIBA 2/2 AMS and agitation, admitted for metabolic encephalopathy likely due to worsening dementia vs tia vs dehydration, adm for r/o cva and further work up. 70 male with PMHx of CVA ([id number]), dementia, CAD s/p CABGx4, DM2, HTN, BIBA 2/2 AMS and agitation, admitted for metabolic encephalopathy likely due to HTN-metabolic  encephalopathy and advancing dementia, CVA ruled out

## 2020-12-09 NOTE — PROGRESS NOTE ADULT - PROBLEM SELECTOR PLAN 3
- Chronic, medication noncompliance, on metformin, glimiperide, and insulin at home  - On admission BG elevated into 300s  - hold home meds  - A1C- 7.9%  - c/w Lantus 10 units qhs, mod ISS  - Hypoglycemia protocol, accuchecks qac and qhs  - Endocrinology, Dr. Perlman, following; recs appreciated - Chronic, medication noncompliance, on metformin, glimiperide, and insulin at home  - On admission BG elevated into 300s  - hold home meds  - A1C- 7.9%  - c/w Lantus 10 units qhs, mod ISS  - admelog 3 units TID premeal added per endo  - Hypoglycemia protocol, accuchecks qac and qhs  - Endocrinology, Dr. Perlman, following; recs appreciated

## 2020-12-09 NOTE — PROGRESS NOTE ADULT - PROBLEM SELECTOR PLAN 9
Lovenox 40mg QD    Dispo: PT recs [facility]. --> SW working with daughter today for dishcarge planning Lovenox 40mg QD    Dispo: PT recs [facility]. --> SW working with daughter today for discharge planning

## 2020-12-09 NOTE — DISCHARGE NOTE PROVIDER - PROVIDER TOKENS
PROVIDER:[TOKEN:[[id number]]] PROVIDER:[TOKEN:[[id number]]],PROVIDER:[TOKEN:[[id number]]],PROVIDER:[TOKEN:[[id number]]],PROVIDER:[TOKEN:[[id number]]]

## 2020-12-09 NOTE — DIETITIAN INITIAL EVALUATION ADULT. - PROBLEM SELECTOR PLAN 1
Metabolic encephalopathy likely multifactorial, TIA vs CVA, hypertensive urgency due to medication noncompliance, hyperglycemia, infection  - In ED, received Haldol 2.5, Ativan 1mg, and 2L IVF Bolus  - Admit to F  - CT Head: Mild chronic microvascular changes without evidence of an acute transcortical infarction or hemorrhage  -CXR- clear lungs.   - f/u Thyroid panel, Lipid Panel, A1C, B12, folate, ammonia  - MR Brain w/o contrast to r/o TIA  - Carotid dopplers  - f/u TTE  - ASA 325mg x1. c/w ASA 81mg QD. Continue with home high dose statin.   - Diet: NPO except meds.  - f/u speech eval.   - Ativan 1mg Q6H PRN for agitation   - Neuro, Dr. Nieves, consulted; f/u recs

## 2020-12-09 NOTE — DIETITIAN INITIAL EVALUATION ADULT. - OTHER CALCULATIONS
Patient able to walk with a walker, planning for CDU observation until around 8:30am.  - Juan Pickett MD estimated needs based on admit wt

## 2020-12-09 NOTE — PROGRESS NOTE ADULT - SUBJECTIVE AND OBJECTIVE BOX
Patient is a 70y old  Male who presents with a chief complaint of AMS (09 Dec 2020 10:11)      INTERVAL HPI/OVERNIGHT EVENTS: CHARTING IN PROGRESS    MEDICATIONS  (STANDING):  aspirin enteric coated 81 milliGRAM(s) Oral daily  atorvastatin 40 milliGRAM(s) Oral at bedtime  dextrose 40% Gel 15 Gram(s) Oral once  dextrose 5%. 1000 milliLiter(s) (50 mL/Hr) IV Continuous <Continuous>  dextrose 5%. 1000 milliLiter(s) (100 mL/Hr) IV Continuous <Continuous>  dextrose 50% Injectable 25 Gram(s) IV Push once  dextrose 50% Injectable 12.5 Gram(s) IV Push once  dextrose 50% Injectable 25 Gram(s) IV Push once  donepezil 10 milliGRAM(s) Oral at bedtime  enoxaparin Injectable 40 milliGRAM(s) SubCutaneous daily  glucagon  Injectable 1 milliGRAM(s) IntraMuscular once  influenza   Vaccine 0.5 milliLiter(s) IntraMuscular once  insulin glargine Injectable (LANTUS) 10 Unit(s) SubCutaneous at bedtime  insulin lispro (ADMELOG) corrective regimen sliding scale   SubCutaneous three times a day before meals  insulin lispro (ADMELOG) corrective regimen sliding scale   SubCutaneous at bedtime  levothyroxine 100 MICROGram(s) Oral daily  metoprolol tartrate 12.5 milliGRAM(s) Oral two times a day    MEDICATIONS  (PRN):  haloperidol     Tablet 2 milliGRAM(s) Oral every 8 hours PRN Severe agitation      Allergies    No Known Allergies    Intolerances        REVIEW OF SYSTEMS:  CONSTITUTIONAL: No fever or chills  HEENT:  No headache, no sore throat  RESPIRATORY: No cough, wheezing, or shortness of breath  CARDIOVASCULAR: No chest pain, palpitations  GASTROINTESTINAL: No abd pain, nausea, vomiting, or diarrhea  GENITOURINARY: No dysuria, frequency, or hematuria  NEUROLOGICAL: no focal weakness or dizziness  MUSCULOSKELETAL: no myalgias or joint pains    Vital Signs Last 24 Hrs  T(C): 36.8 (08 Dec 2020 22:14), Max: 36.8 (08 Dec 2020 22:14)  T(F): 98.3 (08 Dec 2020 22:14), Max: 98.3 (08 Dec 2020 22:14)  HR: 82 (09 Dec 2020 06:05) (78 - 92)  BP: 175/83 (09 Dec 2020 06:05) (160/80 - 184/93)  BP(mean): --  RR: 18 (09 Dec 2020 04:26) (16 - 19)  SpO2: 95% (09 Dec 2020 04:26) (95% - 97%)    PHYSICAL EXAM:  GENERAL: NAD  HEENT:  anicteric, moist mucous membranes  CHEST/LUNG:  CTA b/l, no rales, wheezes, or rhonchi  HEART:  RRR, S1, S2  ABDOMEN:  BS+, soft, nontender, nondistended  EXTREMITIES: no edema, cyanosis, or calf tenderness    LABS:                        14.2   11.08 )-----------( 325      ( 09 Dec 2020 07:54 )             41.0     CBC Full  -  ( 09 Dec 2020 07:54 )  WBC Count : 11.08 K/uL  Hemoglobin : 14.2 g/dL  Hematocrit : 41.0 %  Platelet Count - Automated : 325 K/uL  Mean Cell Volume : 84.0 fl  Mean Cell Hemoglobin : 29.1 pg  Mean Cell Hemoglobin Concentration : 34.6 gm/dL  Auto Neutrophil # : x  Auto Lymphocyte # : x  Auto Monocyte # : x  Auto Eosinophil # : x  Auto Basophil # : x  Auto Neutrophil % : x  Auto Lymphocyte % : x  Auto Monocyte % : x  Auto Eosinophil % : x  Auto Basophil % : x    09 Dec 2020 07:54    142    |  107    |  14     ----------------------------<  185    3.8     |  27     |  1.20     Ca    8.7        09 Dec 2020 07:54  Phos  3.2       09 Dec 2020 07:54  Mg     1.8       09 Dec 2020 07:54    TPro  x      /  Alb  x      /  TBili  0.6    /  DBili  x      /  AST  x      /  ALT  x      /  AlkPhos  x      09 Dec 2020 07:54    PT/INR - ( 09 Dec 2020 07:54 )   PT: 12.6 sec;   INR: 1.08 ratio             CAPILLARY BLOOD GLUCOSE      POCT Blood Glucose.: 170 mg/dL (09 Dec 2020 07:43)  POCT Blood Glucose.: 192 mg/dL (08 Dec 2020 21:26)  POCT Blood Glucose.: 206 mg/dL (08 Dec 2020 16:49)        Culture - Urine (collected 12-06-20 @ 17:55)  Source: .Urine Clean Catch (Midstream)  Final Report (12-07-20 @ 12:49):    <10,000 CFU/mL Normal Urogenital [place]    Culture - Blood (collected 12-06-20 @ 17:03)  Source: .Blood Blood-Peripheral  Preliminary Report (12-07-20 @ 18:01):    No growth to date.    Culture - Blood (collected 12-06-20 @ 17:03)  Source: .Blood Blood-Peripheral  Preliminary Report (12-07-20 @ 18:01):    No growth to date.        RADIOLOGY & ADDITIONAL TESTS:    EEG done today, pending results    < from: MR Head No Cont (12.07.20 @ 11:40) >    EXAM:  MR BRAIN                            PROCEDURE DATE:  12/07/2020          INTERPRETATION:  CLINICAL STATEMENT: Pain.    TECHNIQUE: MRI of the brain was performed without gadolinium.    COMPARISON: CT head 12/6/2020.    FINDINGS:  There is moderate diffuse parenchymal volume loss. There are T2 prolongation signal abnormalities in the periventricular subcortical white matter likely related to mild chronic microvascular ischemic changes.    Chronic hemosiderin deposition left occipital lobe noted. Encephalomalacia/gliosis left temporal lobe with hemosiderin deposition    There is no acute parenchymal hemorrhage, parenchymal mass, mass effect or midline shift. There is no extra-axial fluid collection.  There is no hydrocephalus.  There isno acute infarct.    IMPRESSION:  No acute intracranial hemorrhage or acute infarct.          Personally reviewed.     Consultant(s) Notes Reviewed:  [x] YES  [ ] NO     Patient is a 70y old  Male who presents with a chief complaint of AMS (09 Dec 2020 10:11)      INTERVAL HPI/OVERNIGHT EVENTS: Patient was seen and examined at bedside. Overnight patient had SBPs into 180s. Initially permissive HTN was allowed however since this had been 48 hours, additional agent will be added. BP only improved into SBPs 170s with low dose Metoprolol. He denies any pains or other complaints. States he feels well and is resting. He had EEG performed this morning. Denies fevers, CP, SOB, dizziness, abd pain, urinary symptoms or weakness. Vitals stable, afebrile.     MEDICATIONS  (STANDING):  aspirin enteric coated 81 milliGRAM(s) Oral daily  atorvastatin 40 milliGRAM(s) Oral at bedtime  dextrose 40% Gel 15 Gram(s) Oral once  dextrose 5%. 1000 milliLiter(s) (50 mL/Hr) IV Continuous <Continuous>  dextrose 5%. 1000 milliLiter(s) (100 mL/Hr) IV Continuous <Continuous>  dextrose 50% Injectable 25 Gram(s) IV Push once  dextrose 50% Injectable 12.5 Gram(s) IV Push once  dextrose 50% Injectable 25 Gram(s) IV Push once  donepezil 10 milliGRAM(s) Oral at bedtime  enoxaparin Injectable 40 milliGRAM(s) SubCutaneous daily  glucagon  Injectable 1 milliGRAM(s) IntraMuscular once  influenza   Vaccine 0.5 milliLiter(s) IntraMuscular once  insulin glargine Injectable (LANTUS) 10 Unit(s) SubCutaneous at bedtime  insulin lispro (ADMELOG) corrective regimen sliding scale   SubCutaneous three times a day before meals  insulin lispro (ADMELOG) corrective regimen sliding scale   SubCutaneous at bedtime  levothyroxine 100 MICROGram(s) Oral daily  metoprolol tartrate 12.5 milliGRAM(s) Oral two times a day    MEDICATIONS  (PRN):  haloperidol     Tablet 2 milliGRAM(s) Oral every 8 hours PRN Severe agitation      Allergies    No Known Allergies    Intolerances        REVIEW OF SYSTEMS:  CONSTITUTIONAL: No fever or chills  HEENT:  No headache, no sore throat  RESPIRATORY: No cough, wheezing, or shortness of breath  CARDIOVASCULAR: No chest pain, palpitations  GASTROINTESTINAL: No abd pain, nausea, vomiting, or diarrhea  GENITOURINARY: No dysuria, frequency, or hematuria  NEUROLOGICAL: no focal weakness or dizziness  MUSCULOSKELETAL: no myalgias or joint pains    Vital Signs Last 24 Hrs  T(C): 36.8 (08 Dec 2020 22:14), Max: 36.8 (08 Dec 2020 22:14)  T(F): 98.3 (08 Dec 2020 22:14), Max: 98.3 (08 Dec 2020 22:14)  HR: 82 (09 Dec 2020 06:05) (78 - 92)  BP: 175/83 (09 Dec 2020 06:05) (160/80 - 184/93)  BP(mean): --  RR: 18 (09 Dec 2020 04:26) (16 - 19)  SpO2: 95% (09 Dec 2020 04:26) (95% - 97%)    PHYSICAL EXAM:  GENERAL: NAD  HEENT:  anicteric, moist mucous membranes  CHEST/LUNG:  CTA b/l, no rales, wheezes, or rhonchi  HEART:  RRR, S1, S2  ABDOMEN:  BS+, soft, nontender, nondistended  EXTREMITIES: no edema, cyanosis, or calf tenderness    LABS:                        14.2   11.08 )-----------( 325      ( 09 Dec 2020 07:54 )             41.0     CBC Full  -  ( 09 Dec 2020 07:54 )  WBC Count : 11.08 K/uL  Hemoglobin : 14.2 g/dL  Hematocrit : 41.0 %  Platelet Count - Automated : 325 K/uL  Mean Cell Volume : 84.0 fl  Mean Cell Hemoglobin : 29.1 pg  Mean Cell Hemoglobin Concentration : 34.6 gm/dL  Auto Neutrophil # : x  Auto Lymphocyte # : x  Auto Monocyte # : x  Auto Eosinophil # : x  Auto Basophil # : x  Auto Neutrophil % : x  Auto Lymphocyte % : x  Auto Monocyte % : x  Auto Eosinophil % : x  Auto Basophil % : x    09 Dec 2020 07:54    142    |  107    |  14     ----------------------------<  185    3.8     |  27     |  1.20     Ca    8.7        09 Dec 2020 07:54  Phos  3.2       09 Dec 2020 07:54  Mg     1.8       09 Dec 2020 07:54    TPro  x      /  Alb  x      /  TBili  0.6    /  DBili  x      /  AST  x      /  ALT  x      /  AlkPhos  x      09 Dec 2020 07:54    PT/INR - ( 09 Dec 2020 07:54 )   PT: 12.6 sec;   INR: 1.08 ratio             CAPILLARY BLOOD GLUCOSE      POCT Blood Glucose.: 170 mg/dL (09 Dec 2020 07:43)  POCT Blood Glucose.: 192 mg/dL (08 Dec 2020 21:26)  POCT Blood Glucose.: 206 mg/dL (08 Dec 2020 16:49)        Culture - Urine (collected 12-06-20 @ 17:55)  Source: .Urine Clean Catch (Midstream)  Final Report (12-07-20 @ 12:49):    <10,000 CFU/mL Normal Urogenital [place]    Culture - Blood (collected 12-06-20 @ 17:03)  Source: .Blood Blood-Peripheral  Preliminary Report (12-07-20 @ 18:01):    No growth to date.    Culture - Blood (collected 12-06-20 @ 17:03)  Source: .Blood Blood-Peripheral  Preliminary Report (12-07-20 @ 18:01):    No growth to date.        RADIOLOGY & ADDITIONAL TESTS:    EEG done today, pending results    < from: MR Head No Cont (12.07.20 @ 11:40) >    EXAM:  MR BRAIN                            PROCEDURE DATE:  12/07/2020          INTERPRETATION:  CLINICAL STATEMENT: Pain.    TECHNIQUE: MRI of the brain was performed without gadolinium.    COMPARISON: CT head 12/6/2020.    FINDINGS:  There is moderate diffuse parenchymal volume loss. There are T2 prolongation signal abnormalities in the periventricular subcortical white matter likely related to mild chronic microvascular ischemic changes.    Chronic hemosiderin deposition left occipital lobe noted. Encephalomalacia/gliosis left temporal lobe with hemosiderin deposition    There is no acute parenchymal hemorrhage, parenchymal mass, mass effect or midline shift. There is no extra-axial fluid collection.  There is no hydrocephalus.  There isno acute infarct.    IMPRESSION:  No acute intracranial hemorrhage or acute infarct.          Personally reviewed.     Consultant(s) Notes Reviewed:  [x] YES  [ ] NO

## 2020-12-09 NOTE — PROGRESS NOTE ADULT - PROBLEM SELECTOR PLAN 1
- Metabolic encephalopathy likely multifactorial, unlikely CVA due to negative imaging, poss  hypertensive urgency due to medication noncompliance vs TIA  - In ED, received Haldol 2.5, Ativan 1mg, and 2L IVF Bolus  - CT Head: Mild chronic microvascular changes without evidence of an acute transcortical infarction or hemorrhage  - MRI brain showed no acute infarcts  - CXR- clear lungs  - Carotid dopplers - no stenosis  - TSH initially elevated now wnl at 3.7; T4 in range  - Lipid levels wnl, LDL at goal <70  - TTE showed Normal left ventricular internal dimensions and systolic function, estimated LVEF of 65%.  - EEG performed, pending results  - c/w ASA 81mg QD. Continue with home high dose statin.   - Diet: dysphagia 2 +nectar thick recommended by swallow eval  - neuro (Lizbeth) recs appreciated, will f/u with neuro today regarding BP control as past permissive HTN window - Metabolic encephalopathy likely multifactorial, unlikely CVA due to negative imaging, poss  hypertensive urgency due to medication noncompliance vs TIA  - In ED, received Haldol 2.5, Ativan 1mg, and 2L IVF Bolus  - CT Head: Mild chronic microvascular changes without evidence of an acute transcortical infarction or hemorrhage  - MRI brain showed no acute infarcts  - CXR- clear lungs  - Carotid dopplers - no stenosis  - TSH initially elevated now wnl at 3.7; T4 in range  - Lipid levels wnl, LDL at goal <70  - TTE showed Normal left ventricular internal dimensions and systolic function, estimated LVEF of 65%.  - EEG performed,  Limited abnormal EEG due to the presence of mild bilateral background slowing. This is consistent with bilateral or multifocal cerebral dysfunction of numerous etiologies and are nonspecific.  - c/w ASA 81mg QD. Continue with home high dose statin.   - Diet: dysphagia 2 +nectar thick recommended by swallow eval  - neuro (Lizbeth) recs appreciated

## 2020-12-09 NOTE — PROGRESS NOTE ADULT - SUBJECTIVE AND OBJECTIVE BOX
[facility] -- Karishma Trujillo Grossman, [place], Clinton Chan [place], [place]  Office # [phone]    Follow Up:  CVA    Subjective/Observations: Awake and verbally responsive but remains confused and disoriented.  Able to make needs know and able to follow simple commands.  Comfortable on RA.  With slight right sided hemiparesis.  No new neuro symptoms noted    REVIEW OF SYSTEMS: All other review of systems is negative unless indicated above  PAST MEDICAL & SURGICAL HISTORY:  HTN (hypertension)    CAD (coronary artery disease)  s/p CABG x4    Dementia    DM2 (diabetes mellitus, type 2)    H/O: CVA (cerebrovascular accident)  2018/2019    S/P CABG x 4    MEDICATIONS  (STANDING):  aspirin enteric coated 81 milliGRAM(s) Oral daily  atorvastatin 40 milliGRAM(s) Oral at bedtime  dextrose 40% Gel 15 Gram(s) Oral once  dextrose 5%. 1000 milliLiter(s) (50 mL/Hr) IV Continuous <Continuous>  dextrose 5%. 1000 milliLiter(s) (100 mL/Hr) IV Continuous <Continuous>  dextrose 50% Injectable 25 Gram(s) IV Push once  dextrose 50% Injectable 12.5 Gram(s) IV Push once  dextrose 50% Injectable 25 Gram(s) IV Push once  donepezil 10 milliGRAM(s) Oral at bedtime  enoxaparin Injectable 40 milliGRAM(s) SubCutaneous daily  glucagon  Injectable 1 milliGRAM(s) IntraMuscular once  influenza   Vaccine 0.5 milliLiter(s) IntraMuscular once  insulin glargine Injectable (LANTUS) 10 Unit(s) SubCutaneous at bedtime  insulin lispro (ADMELOG) corrective regimen sliding scale   SubCutaneous three times a day before meals  insulin lispro (ADMELOG) corrective regimen sliding scale   SubCutaneous at bedtime  levothyroxine 100 MICROGram(s) Oral daily  metoprolol tartrate 12.5 milliGRAM(s) Oral two times a day    MEDICATIONS  (PRN):  haloperidol     Tablet 2 milliGRAM(s) Oral every 8 hours PRN Severe agitation    Allergies    No Known Allergies    Intolerances    Vital Signs Last 24 Hrs  T(C): 36.8 (08 Dec 2020 22:14), Max: 36.8 (08 Dec 2020 22:14)  T(F): 98.3 (08 Dec 2020 22:14), Max: 98.3 (08 Dec 2020 22:14)  HR: 82 (09 Dec 2020 06:05) (78 - 92)  BP: 175/83 (09 Dec 2020 06:05) (160/80 - 184/93)  BP(mean): --  RR: 18 (09 Dec 2020 04:26) (16 - 19)  SpO2: 95% (09 Dec 2020 04:26) (95% - 97%)  I&O's Summary    08 Dec 2020 07:01  -  09 Dec 2020 07:00  --------------------------------------------------------  IN: 120 mL / OUT: 1200 mL / NET: -1080 mL    PHYSICAL EXAM:  TELE: NSR  Constitutional: NAD, awake, more alert and responsive, well-developed  HEENT: Moist Mucous Membranes, Anicteric  Pulmonary: Non-labored, breath sounds are clear bilaterally, No wheezing, rales or rhonchi  Cardiovascular: Regular, S1 and S2, No murmurs, rubs, gallops or clicks  Gastrointestinal: Bowel Sounds present, soft, nontender.   MSK: Mild right sided hemiparesis  Lymph: No peripheral edema. No lymphadenopathy.  Skin: No visible rashes or ulcers.  Psych:  Mood & affect: Confused and disoriented        LABS: All Labs Reviewed:                        14.2   11.08 )-----------( 325      ( 09 Dec 2020 07:54 )             41.0                         13.2   8.05  )-----------( 312      ( 08 Dec 2020 07:00 )             37.8                         12.5   8.52  )-----------( 310      ( 07 Dec 2020 06:21 )             36.8     09 Dec 2020 07:54    142    |  107    |  14     ----------------------------<  185    3.8     |  27     |  1.20   08 Dec 2020 07:00    143    |  110    |  12     ----------------------------<  163    3.7     |  24     |  0.94   07 Dec 2020 06:11    140    |  107    |  16     ----------------------------<  160    4.1     |  25     |  1.00     Ca    8.7        09 Dec 2020 07:54  Ca    8.4        08 Dec 2020 07:00  Ca    8.5        07 Dec 2020 06:11  Phos  3.2       09 Dec 2020 07:54  Phos  3.3       08 Dec 2020 07:00  Phos  2.8       07 Dec 2020 06:11  Mg     1.8       09 Dec 2020 07:54  Mg     1.6       08 Dec 2020 07:00  Mg     1.6       07 Dec 2020 06:11    TPro  x      /  Alb  x      /  TBili  0.6    /  DBili  x      /  AST  x      /  ALT  x      /  AlkPhos  x      09 Dec 2020 07:54  TPro  6.7    /  Alb  2.9    /  TBili  0.6    /  DBili  x      /  AST  24     /  ALT  28     /  AlkPhos  107    07 Dec 2020 06:11  TPro  7.9    /  Alb  3.4    /  TBili  0.5    /  DBili  x      /  AST  20     /  ALT  30     /  AlkPhos  114    06 Dec 2020 12:45    PT/INR - ( 09 Dec 2020 07:54 )   PT: 12.6 sec;   INR: 1.08 ratio       EXAM:  ECHO TTE WO CON COMP W DOPP         PROCEDURE DATE:  12/07/2020        INTERPRETATION:  INDICATION: Coronary artery disease  Sonographer KL    Blood Pressure 160/84    Height 162 cm     Weight 72 kg       BSA 1.8 sq cm    Dimensions:  LA 3.4       Normal Values: 2.0 - 4.0 cm  Ao 3.8        Normal Values: 2.0 - 3.8 cm  SEPTUM 1.2       Normal Values: 0.6 - 1.2 cm  PWT 1.0       Normal Values: 0.6 - 1.1 cm  LVIDd 4.2         Normal Values: 3.0 - 5.6 cm  LVIDs 2.7         Normal Values: 1.8 - 4.0 cm      OBSERVATIONS:  Mitral Valve: normal, trace physiologic MR.  Aortic Valve/Aorta: Sclerotic trileaflet aortic valve with normal opening.  Tricuspid Valve: normal with trace TR.  Pulmonic Valve: Trace PI  Left Atrium: normal  Right Atrium: normal  Left Ventricle: normal LV size and systolic function, estimated LVEF of 65%.  Right Ventricle: normal size and systolic function.  Pericardium/Pleura: normal, no significant pericardial effusion.  LV diastolic dysfunction is present    Conclusion:  Normal left ventricular internal dimensions and systolic function, estimated LVEF of 65%.  Normal RV size and systolic function.  Sclerotic trileaflet aortic valve, without AI.  Trace physiologic MR and TR.      CAROLINE BARR MD; Attending Cardiologist  This document has been electronically signed. Dec  8 2020  8:56AM       EXAM:  XR CHEST PORTABLE URGENT 1V                          PROCEDURE DATE:  12/06/2020      INTERPRETATION:  HISTORY: Altered mental status    TECHNIQUE: A single portable view of the chest was obtained.    COMPARISON: None.    FINDINGS:  The cardiac silhouette is normal in size. The patient is status post median sternotomy and CABG. There are no focal consolidations or pleural effusions. The hilar and mediastinal structures appear unremarkable. The osseous structures are intact.    IMPRESSION: Clear lungs.    MARGARETH GIMENEZ MD; Attending Radiologist  This document has been electronically signed. Dec  6 2020  1:41PM      Ventricular Rate 113 BPM    Atrial Rate 113 BPM    P-R Interval 134 ms    QRS Duration 88 ms    Q-T Interval 312 ms    QTC Calculation(Bazett) 427 ms    P Axis 71 degrees    R Axis 48 degrees    T Axis 79 degrees    Diagnosis Line Sinus tachycardia with occasional premature ventricular complexes  Possible Left atrial enlargement  Septal infarct , age undetermined  Abnormal ECG  No previous ECGs available  Confirmed by Darnell Ni MD ([id number]) on 12/7/2020 1:01:45 PM      EXAM:  MR BRAIN                          PROCEDURE DATE:  12/07/2020      INTERPRETATION:  CLINICAL STATEMENT: Pain.    TECHNIQUE: MRI of the brain was performed without gadolinium.    COMPARISON: CT head 12/6/2020.    FINDINGS:  There is moderate diffuse parenchymal volume loss. There are T2 prolongation signal abnormalities in the periventricular subcortical white matter likely related to mild chronic microvascular ischemic changes.    Chronic hemosiderin deposition left occipital lobe noted. Encephalomalacia/gliosis left temporal lobe with hemosiderin deposition    There is no acute parenchymal hemorrhage, parenchymal mass, mass effect or midline shift. There is no extra-axial fluid collection.  There is no hydrocephalus.  There isno acute infarct.    IMPRESSION:  No acute intracranial hemorrhage or acute infarct.    FREDDIE BHAT MD; Attending Radiologist  This document has been electronically signed. Dec  7 2020 11:57AM

## 2020-12-09 NOTE — PROGRESS NOTE ADULT - PROBLEM SELECTOR PLAN 1
cont lantus 10 units qhs  cont mod dose admelog scale coverage  add admelog 3 units 3x/day before meals  cons cho diet  goal bg 100-180 in hosp setting

## 2020-12-09 NOTE — DISCHARGE NOTE PROVIDER - NSDCCPCAREPLAN_GEN_ALL_CORE_FT
PRINCIPAL DISCHARGE DIAGNOSIS  Diagnosis: Altered mental status, unspecified altered mental status type  Assessment and Plan of Treatment: You were found to have altered mental status on arrival to the ED and were evaluated for acute stroke. CT scan of your head showed chronic microvascular changes, MRI of the brain showed no acute strokes or bleeds. Carotid studies did not show any signs of narrowing in the arteries. You were seen by our neurology team and followed throughout your hospitalization. Your blood pressure was very high on arrival to the ED, this was likely due to medication noncompliance. You were treated and your blood pressure was improved upon discharge. You were seen by our cardiology team and followed throughout your hospital course. Please remember to take your medications regularly and remain on all prescribed medications. You will be discharged to subacute rehab facility _______.  START: ____________  STOP: Amlodipine 10mg   Please follow up with your PCP regularly after discharge from Subacute rehab.      SECONDARY DISCHARGE DIAGNOSES  Diagnosis: DM2 (diabetes mellitus, type 2)  Assessment and Plan of Treatment: Your blood sugars were very elevated on arrival to the hospital into the 300s. Your home diabetic medications were stopped and you were treated with insulin in the hospital. Endocrinology team was consulted and treated your diabetes. Please take your medications regularly.  START:  STOP:  CONTINUE:    Diagnosis: HTN (hypertension)  Assessment and Plan of Treatment: Your blood pressure was very elevated on arrival to the ED. Please take all of your blood pressure medications regularly and as prescribed.  START  STOP   CONTINUE    Diagnosis: Sinus tachycardia  Assessment and Plan of Treatment: You were found to have fast heart rate on arrival to the hospital. This was likely related to agitation as it resolved during your hospital stay. We checked blood and urine cultures to rule out infectious causes and these tests were negative. Cardiology followed you throughout your hospital course.     PRINCIPAL DISCHARGE DIAGNOSIS  Diagnosis: Altered mental status, unspecified altered mental status type  Assessment and Plan of Treatment: You were found to have altered mental status on arrival to the ED and were evaluated for acute stroke. CT scan of your head showed chronic microvascular changes, MRI of the brain showed no acute strokes or bleeds. Carotid studies did not show any signs of narrowing in the arteries. You were seen by our neurology team and followed throughout your hospitalization. Your blood pressure was very high on arrival to the ED, this was likely due to medication noncompliance. You were treated and your blood pressure was improved upon discharge. You were seen by our cardiology team and followed throughout your hospital course. Please remember to take your medications regularly and remain on all prescribed medications. You will be discharged to subacute rehab facility/home with assist?? _______.  START: ____________  STOP: Amlodipine 10mg   Please follow up with your PCP regularly after discharge from Subacute rehab.      SECONDARY DISCHARGE DIAGNOSES  Diagnosis: DM2 (diabetes mellitus, type 2)  Assessment and Plan of Treatment: Your blood sugars were very elevated on arrival to the hospital into the 300s. Your home diabetic medications were stopped and you were treated with insulin in the hospital. Endocrinology team was consulted and treated your diabetes. Please take your medications regularly.  START:  STOP:  CONTINUE:    Diagnosis: HTN (hypertension)  Assessment and Plan of Treatment: Your blood pressure was very elevated on arrival to the ED. Please take all of your blood pressure medications regularly and as prescribed.  START  STOP   CONTINUE    Diagnosis: Sinus tachycardia  Assessment and Plan of Treatment: You were found to have fast heart rate on arrival to the hospital. This was likely related to agitation as it resolved during your hospital stay. We checked blood and urine cultures to rule out infectious causes and these tests were negative. Cardiology followed you throughout your hospital course.     PRINCIPAL DISCHARGE DIAGNOSIS  Diagnosis: Altered mental status, unspecified altered mental status type  Assessment and Plan of Treatment: You were found to have altered mental status on arrival to the ED and were evaluated for acute stroke. CT scan of your head showed chronic microvascular changes, MRI of the brain showed no acute strokes or bleeds. Carotid studies did not show any signs of narrowing in the arteries. You were seen by our neurology team and followed throughout your hospitalization. Your blood pressure was very high on arrival to the ED, this was likely due to medication noncompliance. You were treated and your blood pressure was improved upon discharge. You were seen by our cardiology team and followed throughout your hospital course. Please remember to take your medications regularly and remain on all prescribed medications. You will be discharged to home   START: Losartan 50mg daily and Metoprolol tartate 12.5mg twice daily  CONTINUE: Amlodipine 10mg  once daily  Please follow up with your PCP within 1-2 weeks of discharge.  Please follow up with neurology for dementia progression.      SECONDARY DISCHARGE DIAGNOSES  Diagnosis: DM2 (diabetes mellitus, type 2)  Assessment and Plan of Treatment: Your blood sugars were very elevated on arrival to the hospital into the 300s. Your home diabetic medications were stopped and you were treated with insulin in the hospital. Endocrinology team was consulted and treated your diabetes. Please take your medications regularly.  CONTINUE: Home doses of Glimerpide, Metformin and Novolog insulin  Follow up with your PCP within 1-2 weeks of discharge  Please follow up with endocrinology within 2 weeks of discharge for uncontrolled diabetes management.    Diagnosis: HTN (hypertension)  Assessment and Plan of Treatment: Your blood pressure was very elevated on arrival to the ED. Please take all of your blood pressure medications regularly and as prescribed.  START: Losartan 50mg once daily, Metoprolol tartate 12.5mg twice daily  CONTINUE: Amlodipine 10mg once daily  Please follow up with oyur PCP within 1-2 weeks of discharge  Please follow up with cardiology for blood pressure management within 2 weeks of discharge.    Diagnosis: Sinus tachycardia  Assessment and Plan of Treatment: You were found to have fast heart rate on arrival to the hospital. This was likely related to agitation as it resolved during your hospital stay. We checked blood and urine cultures to rule out infectious causes and these tests were negative. Cardiology followed you throughout your hospital course.

## 2020-12-09 NOTE — DISCHARGE NOTE PROVIDER - CARE PROVIDERS DIRECT ADDRESSES
,DirectAddress_Unknown ,DirectAddress_Unknown,[email or web address],DirectAddress_Unknown,DirectAddress_Unknown

## 2020-12-09 NOTE — PROGRESS NOTE ADULT - PROBLEM SELECTOR PLAN 5
- On admission, EKG Sinus Tachy 113. Likely 2/2 agitation from worsening dementia vs reactive  - HR now in range  - WBC slightly elevated at 11, no clear source of infection at this time  - blood and urine cultures NGTD  - EKG sinus tachycardia 113 bpm, borderline LAE, 2 fusion beats, QTc 427  - TSH normal  - will continue Metoprolol 12.5mg BID per cardio recs  - Cardiology, [facility] consulted; f/u recs

## 2020-12-10 VITALS
OXYGEN SATURATION: 97 % | RESPIRATION RATE: 18 BRPM | SYSTOLIC BLOOD PRESSURE: 165 MMHG | HEART RATE: 95 BPM | TEMPERATURE: 98 F | DIASTOLIC BLOOD PRESSURE: 77 MMHG

## 2020-12-10 LAB
ANION GAP SERPL CALC-SCNC: 8 MMOL/L — SIGNIFICANT CHANGE UP (ref 5–17)
BUN SERPL-MCNC: 15 MG/DL — SIGNIFICANT CHANGE UP (ref 7–23)
CALCIUM SERPL-MCNC: 8.9 MG/DL — SIGNIFICANT CHANGE UP (ref 8.5–10.1)
CHLORIDE SERPL-SCNC: 106 MMOL/L — SIGNIFICANT CHANGE UP (ref 96–108)
CO2 SERPL-SCNC: 27 MMOL/L — SIGNIFICANT CHANGE UP (ref 22–31)
CREAT SERPL-MCNC: 1.2 MG/DL — SIGNIFICANT CHANGE UP (ref 0.5–1.3)
GLUCOSE SERPL-MCNC: 198 MG/DL — HIGH (ref 70–99)
HCT VFR BLD CALC: 41.2 % — SIGNIFICANT CHANGE UP (ref 39–50)
HGB BLD-MCNC: 14.4 G/DL — SIGNIFICANT CHANGE UP (ref 13–17)
MAGNESIUM SERPL-MCNC: 2 MG/DL — SIGNIFICANT CHANGE UP (ref 1.6–2.6)
MCHC RBC-ENTMCNC: 29.4 PG — SIGNIFICANT CHANGE UP (ref 27–34)
MCHC RBC-ENTMCNC: 35 GM/DL — SIGNIFICANT CHANGE UP (ref 32–36)
MCV RBC AUTO: 84.1 FL — SIGNIFICANT CHANGE UP (ref 80–100)
NRBC # BLD: 0 /100 WBCS — SIGNIFICANT CHANGE UP (ref 0–0)
PHOSPHATE SERPL-MCNC: 3.6 MG/DL — SIGNIFICANT CHANGE UP (ref 2.5–4.5)
PLATELET # BLD AUTO: 337 K/UL — SIGNIFICANT CHANGE UP (ref 150–400)
POTASSIUM SERPL-MCNC: 3.9 MMOL/L — SIGNIFICANT CHANGE UP (ref 3.5–5.3)
POTASSIUM SERPL-SCNC: 3.9 MMOL/L — SIGNIFICANT CHANGE UP (ref 3.5–5.3)
RBC # BLD: 4.9 M/UL — SIGNIFICANT CHANGE UP (ref 4.2–5.8)
RBC # FLD: 14 % — SIGNIFICANT CHANGE UP (ref 10.3–14.5)
SARS-COV-2 RNA SPEC QL NAA+PROBE: SIGNIFICANT CHANGE UP
SODIUM SERPL-SCNC: 141 MMOL/L — SIGNIFICANT CHANGE UP (ref 135–145)
WBC # BLD: 12.41 K/UL — HIGH (ref 3.8–10.5)
WBC # FLD AUTO: 12.41 K/UL — HIGH (ref 3.8–10.5)

## 2020-12-10 PROCEDURE — 99232 SBSQ HOSP IP/OBS MODERATE 35: CPT

## 2020-12-10 PROCEDURE — 99239 HOSP IP/OBS DSCHRG MGMT >30: CPT | Mod: GC

## 2020-12-10 RX ORDER — LOSARTAN POTASSIUM 100 MG/1
50 TABLET, FILM COATED ORAL DAILY
Refills: 0 | Status: DISCONTINUED | OUTPATIENT
Start: 2020-12-11 | End: 2020-12-10

## 2020-12-10 RX ORDER — LOSARTAN POTASSIUM 100 MG/1
1 TABLET, FILM COATED ORAL
Qty: 30 | Refills: 0
Start: 2020-12-10 | End: 2021-01-08

## 2020-12-10 RX ORDER — LOSARTAN POTASSIUM 100 MG/1
25 TABLET, FILM COATED ORAL ONCE
Refills: 0 | Status: COMPLETED | OUTPATIENT
Start: 2020-12-10 | End: 2020-12-10

## 2020-12-10 RX ORDER — INSULIN GLARGINE 100 [IU]/ML
15 INJECTION, SOLUTION SUBCUTANEOUS AT BEDTIME
Refills: 0 | Status: DISCONTINUED | OUTPATIENT
Start: 2020-12-10 | End: 2020-12-10

## 2020-12-10 RX ORDER — INSULIN LISPRO 100/ML
5 VIAL (ML) SUBCUTANEOUS
Refills: 0 | Status: DISCONTINUED | OUTPATIENT
Start: 2020-12-10 | End: 2020-12-10

## 2020-12-10 RX ORDER — METOPROLOL TARTRATE 50 MG
0.5 TABLET ORAL
Qty: 30 | Refills: 0
Start: 2020-12-10 | End: 2021-01-08

## 2020-12-10 RX ORDER — AMLODIPINE BESYLATE 2.5 MG/1
1 TABLET ORAL
Qty: 0 | Refills: 0 | DISCHARGE
Start: 2020-12-10

## 2020-12-10 RX ADMIN — Medication 10: at 11:39

## 2020-12-10 RX ADMIN — HALOPERIDOL DECANOATE 2 MILLIGRAM(S): 100 INJECTION INTRAMUSCULAR at 07:45

## 2020-12-10 RX ADMIN — ENOXAPARIN SODIUM 40 MILLIGRAM(S): 100 INJECTION SUBCUTANEOUS at 11:21

## 2020-12-10 RX ADMIN — Medication 81 MILLIGRAM(S): at 11:21

## 2020-12-10 RX ADMIN — LOSARTAN POTASSIUM 25 MILLIGRAM(S): 100 TABLET, FILM COATED ORAL at 13:19

## 2020-12-10 RX ADMIN — Medication 3 UNIT(S): at 11:39

## 2020-12-10 RX ADMIN — Medication 100 MICROGRAM(S): at 06:08

## 2020-12-10 RX ADMIN — Medication 12.5 MILLIGRAM(S): at 06:08

## 2020-12-10 RX ADMIN — LOSARTAN POTASSIUM 25 MILLIGRAM(S): 100 TABLET, FILM COATED ORAL at 06:08

## 2020-12-10 NOTE — PROGRESS NOTE ADULT - PROBLEM SELECTOR PLAN 1
increase lantus 15 units qhs  increase admelog 5 units 3x/day before meals  cont mod dose admelog scale coverage qac/qhs  cont cons cho diet  goal bg 100-180 in hosp setting  oral anti-diabetes agents on hold in hosp setting

## 2020-12-10 NOTE — PROGRESS NOTE ADULT - PROBLEM SELECTOR PLAN 9
Lovenox 40mg QD    Dispo: pt no longer QUINTEN candidate as he was able to ambulate >200ft, plan for dc home with homecare.   Pts daughter called stating she is concerned as she thinks he requires a psych eval before discharge to home, attending Dr. Del Cid to speak with daughter, pending dispo plan.

## 2020-12-10 NOTE — PROGRESS NOTE ADULT - PROBLEM SELECTOR PLAN 1
- Metabolic encephalopathy likely multifactorial, unlikely CVA due to negative imaging, poss  hypertensive urgency due to medication noncompliance vs TIA  - In ED, received Haldol 2.5, Ativan 1mg, and 2L IVF Bolus  - CT Head: Mild chronic microvascular changes without evidence of an acute transcortical infarction or hemorrhage  - MRI brain showed no acute infarcts  - CXR- clear lungs  - Carotid dopplers - no stenosis  - TSH initially elevated now wnl at 3.7; T4 in range  - Lipid levels wnl, LDL at goal <70  - TTE showed Normal left ventricular internal dimensions and systolic function, estimated LVEF of 65%.  - EEG performed,  Limited abnormal EEG due to the presence of mild bilateral background slowing. This is consistent with bilateral or multifocal cerebral dysfunction of numerous etiologies and are nonspecific.  - c/w ASA 81mg QD. Continue with home high dose statin.   - Diet: dysphagia 2 +nectar thick recommended by swallow eval  - neuro (Lizbeth) recs appreciated

## 2020-12-10 NOTE — PROGRESS NOTE ADULT - ASSESSMENT
70 male with PMHx of CVA ([id number]), dementia, CAD s/p CABGx4, DM2, HTN, BIBA 2/2 AMS and agitation, admitted for metabolic encephalopathy likely due to HTN-metabolic  encephalopathy and advancing dementia, CVA ruled out

## 2020-12-10 NOTE — DISCHARGE NOTE NURSING/CASE MANAGEMENT/SOCIAL WORK - PATIENT PORTAL LINK FT
You can access the FollowMyHealth Patient Portal offered by [facility] by registering at the following website: [email or web address]. By joining [facility]’s FollowMyHealth portal, you will also be able to view your health information using other applications (apps) compatible with our system.

## 2020-12-10 NOTE — PROGRESS NOTE ADULT - SUBJECTIVE AND OBJECTIVE BOX
Neurology Follow up note    SURY [id number]    HPI:  The patient is a 70 male with PMHx of CVA ([id number]), dementia, hypothyroidism, CAD s/p CABGx4, DM2, HTN who presented to the ED for altered mental status. Son, Jose E Alston at bedside provided history. As per son, patient was dizzy in the morning, was sat down and he blankly stared for five minute duration. When he came back to, he was confused and agitated. Family called EMS, and pt BIBA. Patient repeating same words over and over again, stating he is fine and getting agitated that he is the hospital. As per son, pt continues to make up stories during conversation which started today. He notes father said that he had to sign over a business, when in fact, the patient does not have any business. He also was telling the son about an argument that they had which never occurred. Son states father was A&Ox3 yesterday and in usual state of health. Of note, patient with similar presentation in past, during last CVA. Son unsure of when last CVA was, but states father was hospitalized at [facility]. He had no residual weakness after previous stroke. As per son, patient was in usual state of health yesterday but has been noncompliant with meds for the past 2 weeks. Son, Jose E, does not live with patient. Told to speak to step brother, Hector.   Spoke to son, Hector Alston, over telephone at [phone]. He states father was inhorent in the morning and had the episode of blankly staring at the wall. When he began to respond again, he was slightly agitated and he could not tell who the son was. He states this was similar to past stroke in 2018/2019. Said patient is noncompliant with medication use, and only uses it when he feels like taking medications. Further information can be obtained from [facility] (Portuguese speaking only) at [phone].   In the ED:   Vital Signs: T(F): 97.6, HR: 107 -->86, BP: 203/66 -->139/78, RR: 18, SpO2: 99% on RA  Labs Significant for: WBC 11.65, BUN/Cr - 28/1.40, lactate 2.1, TSH 4.45, Glucose 291, CKMB 3.9, Pro-, UA: small blood, glucose, and occasional bacteria  EKG: Sinus Tach 113bpm, with PVCs; Possible left atrial enlargement, QTc 427ms  CT Head: Mild chronic microvascular changes without evidence of an acute transcortical infarction or hemorrhage. Consider MRI as clinically warranted.  CXR: clear lungs.   In ED pt received, 2L NaCl IV bolus, Haldol 2.5 mg x1, ativan 1mg x1  (06 Dec 2020 15:15)      Interval History -no new events    Patient is seen, chart was reviewed and case was discussed with the treatment team.  Pt is not in any distress.   Lying on bed comfortably.       Vital Signs Last 24 Hrs  T(C): 36.7 (10 Dec 2020 05:51), Max: 37.1 (09 Dec 2020 22:23)  T(F): 98.1 (10 Dec 2020 05:51), Max: 98.7 (09 Dec 2020 22:23)  HR: 105 (10 Dec 2020 05:51) (83 - 105)  BP: 179/87 (10 Dec 2020 05:51) (166/79 - 179/87)  BP(mean): --  RR: 16 (10 Dec 2020 05:51) (16 - 18)  SpO2: 96% (10 Dec 2020 05:51) (95% - 96%)        REVIEW OF SYSTEMS:    Constitutional: No fever,   Eyes: No eye pain, visual disturbances, or discharge  ENT:  No difficulty hearing, tinnitus, vertigo; No sinus or throat pain  Neck: No pain or stiffness  Respiratory: No wheezing, chills or hemoptysis  Cardiovascular: No chest pain, palpitations,   Gastrointestinal: No abdominal or epigastric pain. No nausea, vomiting or hematemesis;  Genitourinary: No dysuria, frequency, hematuria or incontinence  Neurological: No headaches,  Psychiatric: No d, mood swings or difficulty sleeping  Musculoskeletal: No joint pain or swelling; No muscle, back or extremity pain  Skin: No itching, burning, rashes or lesions   Lymph Nodes: No enlarged glands  Endocrine: No heat or cold intolerance; No hair loss,  Allergy and Immunologic: No hives or eczema    On Neurological Examination:    Mental Status - Pt is alert, awake,oriented x 1   following simple command  not agitated      Speech - Pt has dysarthria.    Cranial Nerves - Pupils 3 mm equal and reactive to light, extraocular eye movements intact. Pt has no visual field deficit.  Pt has no facial asymmetry. Facial sensation is intact.Tongue - is in midline.    Muscle tone - is normal     Motor Exam - 4+/5 all over, No drift. No shaking or tremors.    Sensory Exam - . Pt withdraws all extremities equally on stimulation. No asymmetry seen. No complaints of tingling, numbness.      coordination:    Finger to nose: normal      Deep tendon Reflexes - 2 plus all over.        .    Neck Supple -  Yes.     MEDICATIONS  (STANDING):  aspirin enteric coated 81 milliGRAM(s) Oral daily  atorvastatin 40 milliGRAM(s) Oral at bedtime  dextrose 40% Gel 15 Gram(s) Oral once  dextrose 5%. 1000 milliLiter(s) (50 mL/Hr) IV Continuous <Continuous>  dextrose 5%. 1000 milliLiter(s) (100 mL/Hr) IV Continuous <Continuous>  dextrose 50% Injectable 25 Gram(s) IV Push once  dextrose 50% Injectable 12.5 Gram(s) IV Push once  dextrose 50% Injectable 25 Gram(s) IV Push once  donepezil 10 milliGRAM(s) Oral at bedtime  enoxaparin Injectable 40 milliGRAM(s) SubCutaneous daily  glucagon  Injectable 1 milliGRAM(s) IntraMuscular once  influenza   Vaccine 0.5 milliLiter(s) IntraMuscular once  insulin glargine Injectable (LANTUS) 10 Unit(s) SubCutaneous at bedtime  insulin lispro (ADMELOG) corrective regimen sliding scale   SubCutaneous three times a day before meals  insulin lispro (ADMELOG) corrective regimen sliding scale   SubCutaneous at bedtime  insulin lispro Injectable (ADMELOG) 3 Unit(s) SubCutaneous three times a day before meals  levothyroxine 100 MICROGram(s) Oral daily  metoprolol tartrate 12.5 milliGRAM(s) Oral two times a day    MEDICATIONS  (PRN):  haloperidol     Tablet 2 milliGRAM(s) Oral every 8 hours PRN Severe agitation  >      Allergies    No Known Allergies    Intolerances      12-10    141  |  106  |  15  ----------------------------<  198<H>  3.9   |  27  |  1.20    Ca    8.9      10 Dec 2020 08:07  Phos  3.6     12-10  Mg     2.0     12-10    TPro  x   /  Alb  x   /  TBili  0.6  /  DBili  x   /  AST  x   /  ALT  x   /  AlkPhos  x   12-09      Vitamin B12 Vitamin B12, Serum: 724 pg/mL (12-06 @ 23:22)      RADIOLOGY    < from: MR Head No Cont (12.07.20 @ 11:40) >    EXAM:  MR BRAIN                            PROCEDURE DATE:  12/07/2020          INTERPRETATION:  CLINICAL STATEMENT: Pain.    TECHNIQUE: MRI of the brain was performed without gadolinium.    COMPARISON: CT head 12/6/2020.    FINDINGS:  There is moderate diffuse parenchymal volume loss. There are T2 prolongation signal abnormalities in the periventricular subcortical white matter likely related to mild chronic microvascular ischemic changes.    Chronic hemosiderin deposition left occipital lobe noted. Encephalomalacia/gliosis left temporal lobe with hemosiderin deposition    There is no acute parenchymal hemorrhage, parenchymal mass, mass effect or midline shift. There is no extra-axial fluid collection.  There is no hydrocephalus.  There isno acute infarct.    IMPRESSION:  No acute intracranial hemorrhage or acute infarct.            FREDDIE BHAT MD; Attending Radiologist  This document has been electronically signed. Dec  7 2020 11:57AM    ASSESSMENT AND PLAN:      seen for ams  likely metabolic encephalopathy  also element of hypertensive encephalopathy  cva less likely as brain mri is unremarkable  hx of cva/dementia    ANTIHYPERTENSIVE AS PER MEDICAL TEAM  EEG REPORTED AS NO SEIZURE ACTIVITY  ON ARICEPT FOR DEMENTIA  continue asa/statin  Physical therapy evaluation.  OOB to chair/ambulation with assistance only.  Pain is accessed and addressed.  Would continue to follow.

## 2020-12-10 NOTE — PROGRESS NOTE ADULT - PROBLEM SELECTOR PROBLEM 1
DM2 (diabetes mellitus, type 2)
Altered mental status, unspecified altered mental status type

## 2020-12-10 NOTE — PROGRESS NOTE ADULT - PROBLEM SELECTOR PLAN 3
- Chronic, medication noncompliance, on metformin, glimiperide, and insulin at home  - On admission BG elevated into 300s  - hold home meds  - A1C- 7.9%  - c/w Lantus 10 units qhs, mod ISS  - c/w admelog 3 units TID premeal per endo  - Hypoglycemia protocol, accuchecks qac and qhs  - Endocrinology, Dr. Perlman, following; recs appreciated

## 2020-12-10 NOTE — DISCHARGE NOTE NURSING/CASE MANAGEMENT/SOCIAL WORK - NSDCPEPTSTRK_GEN_ALL_CORE
Stroke warning signs and symptoms/Signs and symptoms of stroke/Call 911 for stroke/Prescribed medications/Stroke support groups for patients, families, and friends/Need for follow up after discharge/Risk factors for stroke/Stroke education booklet

## 2020-12-10 NOTE — PROGRESS NOTE ADULT - ASSESSMENT
70 male with PMHx of CVA ([phone]), dementia, hypothyroidism, CAD s/p CABG x 4, DM2, HTN admitted for AMS, consulted for sinus tachycardia.     Sinus tachycardia  - EKG sinus tachycardia 113 bpm, borderline LAE, 2 fusion beats, QTc 427  - TSH normal  - Continue low dose BB for now    CVA  - MR head negative  - Neuro following  - TTE showed normal RV/LVF, EF 65% with no significant valvular disease  - Continue ASA and statin    HTN urgency, 2/2 medication noncompliance  - BP remains elevated up to systolic 170's.  We were allowing permissive HTN initially, now will control as CVA is unlikely per Neuro  - continue bb  - Start Losartan 25 mg daily  - No evidence of volume overload    CAD s/p CABG  - Continue ASA, BB, and statin       DVT ppx  - Per Primary    - Other cardiovascular workup will depend on clinical course.  - All other workup per primary team.  - Will continue to follow.  Izzy Cruz FNP-C  Cardiology NP  [id number] [phone]

## 2020-12-10 NOTE — PROGRESS NOTE ADULT - SUBJECTIVE AND OBJECTIVE BOX
CAPILLARY BLOOD GLUCOSE      POCT Blood Glucose.: 363 mg/dL (10 Dec 2020 11:36)  POCT Blood Glucose.: 131 mg/dL (09 Dec 2020 21:34)  POCT Blood Glucose.: 251 mg/dL (09 Dec 2020 16:31)      Vital Signs Last 24 Hrs  T(C): 36.7 (10 Dec 2020 05:51), Max: 37.1 (09 Dec 2020 22:23)  T(F): 98.1 (10 Dec 2020 05:51), Max: 98.7 (09 Dec 2020 22:23)  HR: 105 (10 Dec 2020 05:51) (83 - 105)  BP: 179/87 (10 Dec 2020 05:51) (166/79 - 179/87)  BP(mean): --  RR: 16 (10 Dec 2020 05:51) (16 - 18)  SpO2: 96% (10 Dec 2020 05:51) (95% - 96%)    General: WN/WD NAD  Respiratory: CTA B/L  CV: RRR, S1S2, no murmurs, rubs or gallops  Abdominal: Soft, NT, ND +BS, Last BM  Extremities: No edema, + peripheral pulses     12-10    141  |  106  |  15  ----------------------------<  198<H>  3.9   |  27  |  1.20    Ca    8.9      10 Dec 2020 08:07  Phos  3.6     12-10  Mg     2.0     12-10    TPro  x   /  Alb  x   /  TBili  0.6  /  DBili  x   /  AST  x   /  ALT  x   /  AlkPhos  x   12-09      atorvastatin 40 milliGRAM(s) Oral at bedtime  dextrose 40% Gel 15 Gram(s) Oral once  dextrose 50% Injectable 25 Gram(s) IV Push once  dextrose 50% Injectable 12.5 Gram(s) IV Push once  dextrose 50% Injectable 25 Gram(s) IV Push once  glucagon  Injectable 1 milliGRAM(s) IntraMuscular once  insulin glargine Injectable (LANTUS) 10 Unit(s) SubCutaneous at bedtime  insulin lispro (ADMELOG) corrective regimen sliding scale   SubCutaneous three times a day before meals  insulin lispro (ADMELOG) corrective regimen sliding scale   SubCutaneous at bedtime  insulin lispro Injectable (ADMELOG) 3 Unit(s) SubCutaneous three times a day before meals  levothyroxine 100 MICROGram(s) Oral daily

## 2020-12-10 NOTE — PROGRESS NOTE ADULT - SUBJECTIVE AND OBJECTIVE BOX
Patient is a 70y old  Male who presents with a chief complaint of AMS (10 Dec 2020 11:05)      INTERVAL HPI/OVERNIGHT EVENTS: Patient seen and examined at bedside. States he feels well and denies pains, CP, SOB, abd pain, urinary difficulties or extremity weakness. States he moved his bowel this AM. He walked with >200 ft with PT this AM and is no longer a candidate for QUINTEN, pt likely to go home with home care.     MEDICATIONS  (STANDING):  aspirin enteric coated 81 milliGRAM(s) Oral daily  atorvastatin 40 milliGRAM(s) Oral at bedtime  dextrose 40% Gel 15 Gram(s) Oral once  dextrose 5%. 1000 milliLiter(s) (50 mL/Hr) IV Continuous <Continuous>  dextrose 5%. 1000 milliLiter(s) (100 mL/Hr) IV Continuous <Continuous>  dextrose 50% Injectable 25 Gram(s) IV Push once  dextrose 50% Injectable 12.5 Gram(s) IV Push once  dextrose 50% Injectable 25 Gram(s) IV Push once  donepezil 10 milliGRAM(s) Oral at bedtime  enoxaparin Injectable 40 milliGRAM(s) SubCutaneous daily  glucagon  Injectable 1 milliGRAM(s) IntraMuscular once  influenza   Vaccine 0.5 milliLiter(s) IntraMuscular once  insulin glargine Injectable (LANTUS) 10 Unit(s) SubCutaneous at bedtime  insulin lispro (ADMELOG) corrective regimen sliding scale   SubCutaneous three times a day before meals  insulin lispro (ADMELOG) corrective regimen sliding scale   SubCutaneous at bedtime  insulin lispro Injectable (ADMELOG) 3 Unit(s) SubCutaneous three times a day before meals  levothyroxine 100 MICROGram(s) Oral daily  losartan 25 milliGRAM(s) Oral once  metoprolol tartrate 12.5 milliGRAM(s) Oral two times a day    MEDICATIONS  (PRN):  haloperidol     Tablet 2 milliGRAM(s) Oral every 8 hours PRN Severe agitation      Allergies    No Known Allergies    Intolerances        REVIEW OF SYSTEMS:  CONSTITUTIONAL: No fever or chills  HEENT:  No headache, no sore throat  RESPIRATORY: No cough, wheezing, or shortness of breath  CARDIOVASCULAR: No chest pain, palpitations  GASTROINTESTINAL: No abd pain, nausea, vomiting, or diarrhea  GENITOURINARY: No dysuria, frequency, or hematuria  NEUROLOGICAL: no focal weakness or dizziness  MUSCULOSKELETAL: no myalgias or joint pains    Vital Signs Last 24 Hrs  T(C): 36.7 (10 Dec 2020 05:51), Max: 37.1 (09 Dec 2020 22:23)  T(F): 98.1 (10 Dec 2020 05:51), Max: 98.7 (09 Dec 2020 22:23)  HR: 105 (10 Dec 2020 05:51) (83 - 105)  BP: 179/87 (10 Dec 2020 05:51) (166/79 - 179/87)  BP(mean): --  RR: 16 (10 Dec 2020 05:51) (16 - 18)  SpO2: 96% (10 Dec 2020 05:51) (95% - 96%)    PHYSICAL EXAM:  GENERAL: NAD, laying in bed comfortably  HEENT:  anicteric, moist mucous membranes, PERRLA b/l  CHEST/LUNG:  CTA b/l, no rales, wheezes, or rhonchi  HEART:  RRR, S1, S2  ABDOMEN:  BS+, soft, nontender, nondistended  EXTREMITIES: no edema, cyanosis, or calf tenderness    LABS:                        14.4   12.41 )-----------( 337      ( 10 Dec 2020 08:07 )             41.2     CBC Full  -  ( 10 Dec 2020 08:07 )  WBC Count : 12.41 K/uL  Hemoglobin : 14.4 g/dL  Hematocrit : 41.2 %  Platelet Count - Automated : 337 K/uL  Mean Cell Volume : 84.1 fl  Mean Cell Hemoglobin : 29.4 pg  Mean Cell Hemoglobin Concentration : 35.0 gm/dL  Auto Neutrophil # : x  Auto Lymphocyte # : x  Auto Monocyte # : x  Auto Eosinophil # : x  Auto Basophil # : x  Auto Neutrophil % : x  Auto Lymphocyte % : x  Auto Monocyte % : x  Auto Eosinophil % : x  Auto Basophil % : x    10 Dec 2020 08:07    141    |  106    |  15     ----------------------------<  198    3.9     |  27     |  1.20     Ca    8.9        10 Dec 2020 08:07  Phos  3.6       10 Dec 2020 08:07  Mg     2.0       10 Dec 2020 08:07      PT/INR - ( 09 Dec 2020 07:54 )   PT: 12.6 sec;   INR: 1.08 ratio             CAPILLARY BLOOD GLUCOSE      POCT Blood Glucose.: 131 mg/dL (09 Dec 2020 21:34)  POCT Blood Glucose.: 251 mg/dL (09 Dec 2020 16:31)  POCT Blood Glucose.: 325 mg/dL (09 Dec 2020 11:40)        Culture - Urine (collected 12-06-20 @ 17:55)  Source: .Urine Clean Catch (Midstream)  Final Report (12-07-20 @ 12:49):    <10,000 CFU/mL Normal Urogenital [place]    Culture - Blood (collected 12-06-20 @ 17:03)  Source: .Blood Blood-Peripheral  Preliminary Report (12-07-20 @ 18:01):    No growth to date.    Culture - Blood (collected 12-06-20 @ 17:03)  Source: .Blood Blood-Peripheral  Preliminary Report (12-07-20 @ 18:01):    No growth to date.        RADIOLOGY & ADDITIONAL TESTS:   < from: EEG Awake or Drowsy (12.09.20 @ 10:30) >     EXAM:  EEG-AWAKE AND DROWSY        PROCEDURE DATE:  12/09/2020        INTERPRETATION:  GENERAL DESCRIPTION:  The EEG was recorded with a 32-channel digital EEG machine. Standard interval 10/20 lateral replacements were used.    CONDITIONS OF RECORDING: The EEG was carried out with the patient in the awake and drowsy state.  Muscle and movement artifacts are present during the waking record making EEG tracing at times unreadable.    DESCRIPTION: The resting waking and background rhythms consistof moderate voltage, symmetrical activity. The frequency spectrum shows a moderate amount of theta and beta activity in negligible amount of delta activity. The posterior dominant rhythm is 5-6 Hz. was seen.    The patient drowsiness with slowing to irregular low voltage theta and beta activity.    Hyperventilation was not performed because a medical reasons. Intermittent photic stimulation from 2-20 flashes per second fails to elicit any  abnormality.    There were no areas of focal slowing, epilepticform discharges or electrographic seizures.    IMPRESSION: Limited abnormal EEG due to the presence of mild bilateral background slowing. This is consistent with bilateral or multifocal cerebral dysfunction of numerous etiologies and are nonspecific.    COMMENT: A video 24-48 hours EEG might be desirable to further evaluate for possible seizure disorder, if clinically necessary. This EEG does not exclude the clinical diagnosis of seizures or epilepsy.        Personally reviewed.     Consultant(s) Notes Reviewed:  [x] YES  [ ] NO

## 2020-12-10 NOTE — PROGRESS NOTE ADULT - PROBLEM SELECTOR PLAN 4
- Chronic, on amlodipine at home. Medication noncompliance  - On admission, /66 on admission, likely hypertensive urgency due to negative imaging and CVA workup  - Home medication: amlodipine 10mg QD discontinued and started on metoprolol per cardio recs.  - Losartan 25mg daily added  - SBP remains elevated into 170s, additional 25mg QD today and start Losartan 50mg from tomorrow  - monitor serial BPs  - Cardio ([place]) following, recs appreciated

## 2020-12-10 NOTE — PROGRESS NOTE ADULT - PROBLEM SELECTOR PLAN 6
Chronic  - c/w home atorvastatin 40mg QD,   - c/w 81mg QD  - TTE wnl as above  - Losartan increased to 50mg daily

## 2020-12-10 NOTE — PROGRESS NOTE ADULT - SUBJECTIVE AND OBJECTIVE BOX
[facility] -- Karishma Trujillo, Raine Gottlieb, Clinton Chan Savella  Office # [phone]      Follow Up:    CABG , Sinus tach     Subjective/Observations:   Seen at bedside, alert, confused, in no acute distress       REVIEW OF SYSTEMS: unable to provide any meaningful information     PAST MEDICAL & SURGICAL HISTORY:  HTN (hypertension)    CAD (coronary artery disease)  s/p CABG x4    Dementia    DM2 (diabetes mellitus, type 2)    H/O: CVA (cerebrovascular accident)  2018/2019    S/P CABG x 4        MEDICATIONS  (STANDING):  aspirin enteric coated 81 milliGRAM(s) Oral daily  atorvastatin 40 milliGRAM(s) Oral at bedtime  dextrose 40% Gel 15 Gram(s) Oral once  dextrose 5%. 1000 milliLiter(s) (50 mL/Hr) IV Continuous <Continuous>  dextrose 5%. 1000 milliLiter(s) (100 mL/Hr) IV Continuous <Continuous>  dextrose 50% Injectable 25 Gram(s) IV Push once  dextrose 50% Injectable 12.5 Gram(s) IV Push once  dextrose 50% Injectable 25 Gram(s) IV Push once  donepezil 10 milliGRAM(s) Oral at bedtime  enoxaparin Injectable 40 milliGRAM(s) SubCutaneous daily  glucagon  Injectable 1 milliGRAM(s) IntraMuscular once  influenza   Vaccine 0.5 milliLiter(s) IntraMuscular once  insulin glargine Injectable (LANTUS) 10 Unit(s) SubCutaneous at bedtime  insulin lispro (ADMELOG) corrective regimen sliding scale   SubCutaneous three times a day before meals  insulin lispro (ADMELOG) corrective regimen sliding scale   SubCutaneous at bedtime  insulin lispro Injectable (ADMELOG) 3 Unit(s) SubCutaneous three times a day before meals  levothyroxine 100 MICROGram(s) Oral daily  losartan 25 milliGRAM(s) Oral once  metoprolol tartrate 12.5 milliGRAM(s) Oral two times a day    MEDICATIONS  (PRN):  haloperidol     Tablet 2 milliGRAM(s) Oral every 8 hours PRN Severe agitation      Allergies    No Known Allergies    Intolerances        Vital Signs Last 24 Hrs  T(C): 36.7 (10 Dec 2020 05:51), Max: 37.1 (09 Dec 2020 22:23)  T(F): 98.1 (10 Dec 2020 05:51), Max: 98.7 (09 Dec 2020 22:23)  HR: 105 (10 Dec 2020 05:51) (83 - 105)  BP: 179/87 (10 Dec 2020 05:51) (166/79 - 179/87)  BP(mean): --  RR: 16 (10 Dec 2020 05:51) (16 - 18)  SpO2: 96% (10 Dec 2020 05:51) (95% - 96%)    I&O's Summary    09 Dec 2020 07:01  -  10 Dec 2020 07:00  --------------------------------------------------------  IN: 0 mL / OUT: 750 mL / NET: -750 mL          PHYSICAL EXAM:  TELE: not on tele   Constitutional: NAD, awake and alert, well-developed  HEENT: Moist Mucous Membranes, Anicteric  Pulmonary: Non-labored, breath sounds are clear bilaterally, No wheezing, crackles or rhonchi  Cardiovascular: Regular, S1 and S2 nl, No murmurs, rubs, gallops or clicks  Gastrointestinal: Bowel Sounds present, soft, nontender.   Lymph: No lymphadenopathy. No peripheral edema.  Skin: No visible rashes or ulcers.  Psych:  alert, confused     LABS: All Labs Reviewed:                        14.4   12.41 )-----------( 337      ( 10 Dec 2020 08:07 )             41.2                         14.2   11.08 )-----------( 325      ( 09 Dec 2020 07:54 )             41.0                         13.2   8.05  )-----------( 312      ( 08 Dec 2020 07:00 )             37.8     10 Dec 2020 08:07    141    |  106    |  15     ----------------------------<  198    3.9     |  27     |  1.20   09 Dec 2020 07:54    142    |  107    |  14     ----------------------------<  185    3.8     |  27     |  1.20   08 Dec 2020 07:00    143    |  110    |  12     ----------------------------<  163    3.7     |  24     |  0.94     Ca    8.9        10 Dec 2020 08:07  Ca    8.7        09 Dec 2020 07:54  Ca    8.4        08 Dec 2020 07:00  Phos  3.6       10 Dec 2020 08:07  Phos  3.2       09 Dec 2020 07:54  Phos  3.3       08 Dec 2020 07:00  Mg     2.0       10 Dec 2020 08:07  Mg     1.8       09 Dec 2020 07:54  Mg     1.6       08 Dec 2020 07:00    TPro  x      /  Alb  x      /  TBili  0.6    /  DBili  x      /  AST  x      /  ALT  x      /  AlkPhos  x      09 Dec 2020 07:54    PT/INR - ( 09 Dec 2020 07:54 )   PT: 12.6 sec;   INR: 1.08 ratio                  ECG:  < from: 12 Lead ECG (12.06.20 @ 11:40) >  Ventricular Rate 113 BPM    Atrial Rate 113 BPM    P-R Interval 134 ms    QRS Duration 88 ms    Q-T Interval 312 ms    QTC Calculation(Bazett) 427 ms    P Axis 71 degrees    R Axis 48 degrees    T Axis 79 degrees    Diagnosis Line Sinus tachycardiawith occasional premature ventricular complexes  Possible Left atrial enlargement  Septal infarct , age undetermined  Abnormal ECG  No previous ECGs available    < end of copied text >      Echo:  < from: TTE Echo Complete w/o Contrast w/ Doppler (12.07.20 @ 13:49) >    OBSERVATIONS:  Mitral Valve: normal, trace physiologic MR.  Aortic Valve/Aorta: Sclerotic trileaflet aortic valve with normal opening.  Tricuspid Valve: normal with trace TR.  Pulmonic Valve: Trace PI  Left Atrium: normal  Right Atrium: normal  Left Ventricle: normal LV size and systolic function, estimated LVEF of 65%.  Right Ventricle: normal size and systolic function.  Pericardium/Pleura: normal, no significant pericardial effusion.  LV diastolic dysfunction is present    Conclusion:  Normal left ventricular internal dimensions and systolic function, estimated LVEF of 65%.  Normal RV size and systolic function.  Sclerotic trileaflet aortic valve, without AI.  Trace physiologic MR and TR    < end of copied text >      Radiology:  < from: Xray Chest 1 View- PORTABLE-Urgent (Xray Chest 1 View- PORTABLE-Urgent .) (12.06.20 @ 13:33) >    FINDINGS:  The cardiac silhouette is normal in size. The patient is status post median sternotomy and CABG. There are no focal consolidations or pleural effusions. The hilar and mediastinal structures appear unremarkable. The osseous structures are intact.    IMPRESSION: Clear lungs.    < end of copied text >

## 2020-12-11 LAB
CULTURE RESULTS: SIGNIFICANT CHANGE UP
CULTURE RESULTS: SIGNIFICANT CHANGE UP
SPECIMEN SOURCE: SIGNIFICANT CHANGE UP
SPECIMEN SOURCE: SIGNIFICANT CHANGE UP

## 2020-12-28 PROCEDURE — 84443 ASSAY THYROID STIM HORMONE: CPT

## 2020-12-28 PROCEDURE — 82962 GLUCOSE BLOOD TEST: CPT

## 2020-12-28 PROCEDURE — 93306 TTE W/DOPPLER COMPLETE: CPT

## 2020-12-28 PROCEDURE — 87086 URINE CULTURE/COLONY COUNT: CPT

## 2020-12-28 PROCEDURE — 80307 DRUG TEST PRSMV CHEM ANLYZR: CPT

## 2020-12-28 PROCEDURE — 82553 CREATINE MB FRACTION: CPT

## 2020-12-28 PROCEDURE — 84480 ASSAY TRIIODOTHYRONINE (T3): CPT

## 2020-12-28 PROCEDURE — 81001 URINALYSIS AUTO W/SCOPE: CPT

## 2020-12-28 PROCEDURE — 97112 NEUROMUSCULAR REEDUCATION: CPT

## 2020-12-28 PROCEDURE — 80048 BASIC METABOLIC PNL TOTAL CA: CPT

## 2020-12-28 PROCEDURE — 85025 COMPLETE CBC W/AUTO DIFF WBC: CPT

## 2020-12-28 PROCEDURE — 80061 LIPID PANEL: CPT

## 2020-12-28 PROCEDURE — 82746 ASSAY OF FOLIC ACID SERUM: CPT

## 2020-12-28 PROCEDURE — 70551 MRI BRAIN STEM W/O DYE: CPT

## 2020-12-28 PROCEDURE — 80053 COMPREHEN METABOLIC PANEL: CPT

## 2020-12-28 PROCEDURE — 84436 ASSAY OF TOTAL THYROXINE: CPT

## 2020-12-28 PROCEDURE — 70450 CT HEAD/BRAIN W/O DYE: CPT

## 2020-12-28 PROCEDURE — 84100 ASSAY OF PHOSPHORUS: CPT

## 2020-12-28 PROCEDURE — 96361 HYDRATE IV INFUSION ADD-ON: CPT

## 2020-12-28 PROCEDURE — 83605 ASSAY OF LACTIC ACID: CPT

## 2020-12-28 PROCEDURE — 86769 SARS-COV-2 COVID-19 ANTIBODY: CPT

## 2020-12-28 PROCEDURE — 93005 ELECTROCARDIOGRAM TRACING: CPT

## 2020-12-28 PROCEDURE — 86803 HEPATITIS C AB TEST: CPT

## 2020-12-28 PROCEDURE — 99285 EMERGENCY DEPT VISIT HI MDM: CPT | Mod: 25

## 2020-12-28 PROCEDURE — 82140 ASSAY OF AMMONIA: CPT

## 2020-12-28 PROCEDURE — 71045 X-RAY EXAM CHEST 1 VIEW: CPT

## 2020-12-28 PROCEDURE — U0003: CPT

## 2020-12-28 PROCEDURE — 87040 BLOOD CULTURE FOR BACTERIA: CPT

## 2020-12-28 PROCEDURE — 85027 COMPLETE CBC AUTOMATED: CPT

## 2020-12-28 PROCEDURE — 82565 ASSAY OF CREATININE: CPT

## 2020-12-28 PROCEDURE — 85730 THROMBOPLASTIN TIME PARTIAL: CPT

## 2020-12-28 PROCEDURE — 83735 ASSAY OF MAGNESIUM: CPT

## 2020-12-28 PROCEDURE — 95816 EEG AWAKE AND DROWSY: CPT

## 2020-12-28 PROCEDURE — 84295 ASSAY OF SERUM SODIUM: CPT

## 2020-12-28 PROCEDURE — 36415 COLL VENOUS BLD VENIPUNCTURE: CPT

## 2020-12-28 PROCEDURE — 83880 ASSAY OF NATRIURETIC PEPTIDE: CPT

## 2020-12-28 PROCEDURE — 92526 ORAL FUNCTION THERAPY: CPT

## 2020-12-28 PROCEDURE — 93880 EXTRACRANIAL BILAT STUDY: CPT

## 2020-12-28 PROCEDURE — 97162 PT EVAL MOD COMPLEX 30 MIN: CPT

## 2020-12-28 PROCEDURE — 83036 HEMOGLOBIN GLYCOSYLATED A1C: CPT

## 2020-12-28 PROCEDURE — 82247 BILIRUBIN TOTAL: CPT

## 2020-12-28 PROCEDURE — 85610 PROTHROMBIN TIME: CPT

## 2020-12-28 PROCEDURE — 82607 VITAMIN B-12: CPT

## 2020-12-28 PROCEDURE — 97116 GAIT TRAINING THERAPY: CPT

## 2020-12-28 PROCEDURE — 84484 ASSAY OF TROPONIN QUANT: CPT

## 2020-12-28 PROCEDURE — 92610 EVALUATE SWALLOWING FUNCTION: CPT

## 2020-12-28 PROCEDURE — 97110 THERAPEUTIC EXERCISES: CPT

## 2021-04-06 PROBLEM — F03.90 UNSPECIFIED DEMENTIA WITHOUT BEHAVIORAL DISTURBANCE: Chronic | Status: ACTIVE | Noted: 2020-12-06

## 2021-04-06 PROBLEM — I25.10 ATHEROSCLEROTIC HEART DISEASE OF NATIVE CORONARY ARTERY WITHOUT ANGINA PECTORIS: Chronic | Status: ACTIVE | Noted: 2020-12-06

## 2021-04-06 PROBLEM — Z86.73 PERSONAL HISTORY OF TRANSIENT ISCHEMIC ATTACK (TIA), AND CEREBRAL INFARCTION WITHOUT RESIDUAL DEFICITS: Chronic | Status: ACTIVE | Noted: 2020-12-06

## 2021-04-06 PROBLEM — I10 ESSENTIAL (PRIMARY) HYPERTENSION: Chronic | Status: ACTIVE | Noted: 2020-12-06

## 2021-04-06 PROBLEM — E11.9 TYPE 2 DIABETES MELLITUS WITHOUT COMPLICATIONS: Chronic | Status: ACTIVE | Noted: 2020-12-06

## 2022-02-12 NOTE — PATIENT PROFILE ADULT - BILL PAYMENT
[NAD] : in no acute distress [icteric] : anicteric [Respiratory Distress] : no respiratory distress  [Soft] : soft  [Focal Deficits] : no focal deficits [Verbal] : verbal [Rash] : no rash [Appropriate Affect] : appropriate affect [FreeTextEntry1] : (limited exam due to telehealth) no

## 2022-10-24 NOTE — H&P ADULT - PROBLEM SELECTOR PLAN 2
Chronic, medication noncompliance, on metformin, glimiperide, and insulin at home  - On admission POCT 361, 341; Serum Glucose 291  - hold home meds  - f/u A1C  - ISS  - POCT FS   - Hypoglycemia protocol  - Endocrinology, Dr. Perlman, consulted; f/u recs No Chronic, medication noncompliance, on metformin, glimiperide, and insulin at home  - On admission POCT 361, 341; Serum Glucose 291  - hold home meds  - f/u A1C  - low dose ISS, 4u lantus QHS  - POCT FS   - Hypoglycemia protocol  - Endocrinology, Dr. Perlman, consulted; f/u recs